# Patient Record
Sex: FEMALE | Race: WHITE | Employment: FULL TIME | URBAN - METROPOLITAN AREA
[De-identification: names, ages, dates, MRNs, and addresses within clinical notes are randomized per-mention and may not be internally consistent; named-entity substitution may affect disease eponyms.]

---

## 2021-03-24 ENCOUNTER — TRANSCRIBE ORDER (OUTPATIENT)
Dept: SCHEDULING | Age: 56
End: 2021-03-24

## 2021-03-24 DIAGNOSIS — Z12.31 VISIT FOR SCREENING MAMMOGRAM: Primary | ICD-10-CM

## 2021-04-23 ENCOUNTER — HOSPITAL ENCOUNTER (OUTPATIENT)
Dept: MAMMOGRAPHY | Age: 56
Discharge: HOME | End: 2021-04-23
Payer: COMMERCIAL

## 2021-04-23 DIAGNOSIS — Z12.31 VISIT FOR SCREENING MAMMOGRAM: ICD-10-CM

## 2021-04-23 PROCEDURE — 77067 SCR MAMMO BI INCL CAD: CPT

## 2022-01-10 PROBLEM — E55.9 VITAMIN D DEFICIENCY: Status: ACTIVE | Noted: 2017-01-26

## 2022-01-10 PROBLEM — E11.9 TYPE 2 DIABETES MELLITUS WITHOUT COMPLICATION, WITHOUT LONG-TERM CURRENT USE OF INSULIN (HCC): Status: ACTIVE | Noted: 2019-07-22

## 2022-01-10 PROBLEM — R74.8 ELEVATED LIVER ENZYMES: Status: ACTIVE | Noted: 2020-09-28

## 2022-01-10 PROBLEM — I10 HYPERTENSION: Status: ACTIVE | Noted: 2017-02-23

## 2022-01-10 PROBLEM — F41.9 ANXIETY: Status: ACTIVE | Noted: 2017-03-29

## 2022-01-10 PROBLEM — E78.5 HYPERLIPIDEMIA: Status: ACTIVE | Noted: 2017-01-26

## 2022-01-10 PROBLEM — D72.810 LYMPHOCYTOPENIA: Status: ACTIVE | Noted: 2019-07-24

## 2022-01-10 PROBLEM — M79.601 PAIN OF RIGHT ARM: Status: ACTIVE | Noted: 2017-01-26

## 2022-03-11 ENCOUNTER — TRANSCRIBE ORDER (OUTPATIENT)
Dept: SCHEDULING | Age: 57
End: 2022-03-11

## 2022-03-11 DIAGNOSIS — Z12.31 VISIT FOR SCREENING MAMMOGRAM: Primary | ICD-10-CM

## 2022-03-18 PROBLEM — M79.601 PAIN OF RIGHT ARM: Status: ACTIVE | Noted: 2017-01-26

## 2022-03-19 PROBLEM — E11.9 TYPE 2 DIABETES MELLITUS WITHOUT COMPLICATION, WITHOUT LONG-TERM CURRENT USE OF INSULIN (HCC): Status: ACTIVE | Noted: 2019-07-22

## 2022-03-19 PROBLEM — E55.9 VITAMIN D DEFICIENCY: Status: ACTIVE | Noted: 2017-01-26

## 2022-03-19 PROBLEM — E78.5 HYPERLIPIDEMIA: Status: ACTIVE | Noted: 2017-01-26

## 2022-03-19 PROBLEM — D72.810 LYMPHOCYTOPENIA: Status: ACTIVE | Noted: 2019-07-24

## 2022-03-19 PROBLEM — I10 HYPERTENSION: Status: ACTIVE | Noted: 2017-02-23

## 2022-03-20 PROBLEM — F41.9 ANXIETY: Status: ACTIVE | Noted: 2017-03-29

## 2022-03-20 PROBLEM — R74.8 ELEVATED LIVER ENZYMES: Status: ACTIVE | Noted: 2020-09-28

## 2022-05-10 ENCOUNTER — HOSPITAL ENCOUNTER (OUTPATIENT)
Dept: MAMMOGRAPHY | Age: 57
Discharge: HOME | End: 2022-05-10
Payer: COMMERCIAL

## 2022-05-10 DIAGNOSIS — Z12.31 VISIT FOR SCREENING MAMMOGRAM: ICD-10-CM

## 2022-05-10 PROCEDURE — 77067 SCR MAMMO BI INCL CAD: CPT

## 2023-01-29 ENCOUNTER — HOSPITAL ENCOUNTER (EMERGENCY)
Age: 58
Discharge: HOME | End: 2023-01-29
Attending: EMERGENCY MEDICINE
Payer: COMMERCIAL

## 2023-01-29 VITALS
TEMPERATURE: 98.2 F | RESPIRATION RATE: 14 BRPM | OXYGEN SATURATION: 100 % | WEIGHT: 231 LBS | HEART RATE: 60 BPM | DIASTOLIC BLOOD PRESSURE: 53 MMHG | BODY MASS INDEX: 38.44 KG/M2 | SYSTOLIC BLOOD PRESSURE: 120 MMHG

## 2023-01-29 DIAGNOSIS — T78.2XXA ANAPHYLAXIS, INITIAL ENCOUNTER: Primary | ICD-10-CM

## 2023-01-29 PROCEDURE — 96375 TX/PRO/DX INJ NEW DRUG ADDON: CPT

## 2023-01-29 PROCEDURE — 99284 EMERGENCY DEPT VISIT MOD MDM: CPT

## 2023-01-29 PROCEDURE — 96372 THER/PROPH/DIAG INJ SC/IM: CPT

## 2023-01-29 PROCEDURE — 94762 N-INVAS EAR/PLS OXIMTRY CONT: CPT

## 2023-01-29 PROCEDURE — 96374 THER/PROPH/DIAG INJ IV PUSH: CPT

## 2023-01-29 PROCEDURE — 74011250636 HC RX REV CODE- 250/636: Performed by: EMERGENCY MEDICINE

## 2023-01-29 RX ORDER — FAMOTIDINE 20 MG/1
20 TABLET, FILM COATED ORAL 2 TIMES DAILY
Qty: 20 TABLET | Refills: 0 | Status: SHIPPED | OUTPATIENT
Start: 2023-01-29 | End: 2023-02-08

## 2023-01-29 RX ORDER — EPINEPHRINE 0.3 MG/.3ML
0.3 INJECTION SUBCUTANEOUS
Qty: 2 EACH | Refills: 0 | Status: SHIPPED | OUTPATIENT
Start: 2023-01-29 | End: 2023-01-29

## 2023-01-29 RX ORDER — GABAPENTIN 100 MG/1
100 CAPSULE ORAL 3 TIMES DAILY
Qty: 30 CAPSULE | Refills: 0 | Status: SHIPPED | OUTPATIENT
Start: 2023-01-29

## 2023-01-29 RX ORDER — PREDNISONE 20 MG/1
20 TABLET ORAL DAILY
Qty: 7 TABLET | Refills: 0 | Status: SHIPPED | OUTPATIENT
Start: 2023-01-30 | End: 2023-02-06

## 2023-01-29 RX ORDER — FAMOTIDINE 10 MG/ML
20 INJECTION INTRAVENOUS
Status: COMPLETED | OUTPATIENT
Start: 2023-01-29 | End: 2023-01-29

## 2023-01-29 RX ORDER — SODIUM CHLORIDE 0.9 % (FLUSH) 0.9 %
5-10 SYRINGE (ML) INJECTION ONCE
Status: DISCONTINUED | OUTPATIENT
Start: 2023-01-29 | End: 2023-01-29 | Stop reason: HOSPADM

## 2023-01-29 RX ORDER — CETIRIZINE HYDROCHLORIDE 10 MG/1
10 TABLET ORAL DAILY
Qty: 20 TABLET | Refills: 0 | Status: SHIPPED | OUTPATIENT
Start: 2023-01-29

## 2023-01-29 RX ADMIN — FAMOTIDINE 20 MG: 10 INJECTION, SOLUTION INTRAVENOUS at 07:24

## 2023-01-29 RX ADMIN — EPINEPHRINE 0.3 MG: 1 INJECTION, SOLUTION, CONCENTRATE INTRAVENOUS at 07:28

## 2023-01-29 RX ADMIN — METHYLPREDNISOLONE SODIUM SUCCINATE 125 MG: 125 INJECTION, POWDER, FOR SOLUTION INTRAMUSCULAR; INTRAVENOUS at 07:26

## 2023-01-29 NOTE — Clinical Note
[address] [place] [address] EMERGENCY DEP  [address] [place] [address] Dr Brad Jacques [id number]  [phone]    Work/School Note    Date: 1/29/2023    To Whom It May concern:    Manisha Cruz was seen and treated today in the emergency room by the following provider(s):  Attending Provider: Lucho Pearl MD.      Manisha Cruz is excused from work/school on 01/29/23 and 01/30/23. She is medically clear to return to work/school on 1/31/2023. Sincerely,          Allison Dennis.  MD Teofilo

## 2023-01-29 NOTE — DISCHARGE INSTRUCTIONS
You were seen in the emergency room for allergic reaction that has progressed and continued to worsen. Take the steroids every day as prescribed and avoid your Lyrica. Continue taking the rest of your medications and monitor your symptoms closely. If you have any more swelling of the airway, use your epinephrine pen and then go directly to your closest emergency room.

## 2023-01-29 NOTE — Clinical Note
[address] [place] [address] EMERGENCY DEP  [address] [facility] [address] [place] [address]  [phone]    Work/School Note    Date: 1/29/2023    To Whom It May concern:    Yaritza Buckner was seen and treated today in the emergency room by the following provider(s):  Attending Provider: Gill Denver., MD.      Yaritza Buckner is excused from work/school on 01/29/23 and 01/30/23. She is medically clear to return to work/school on 1/31/2023.        Sincerely,          Alondra Hastings RN

## 2023-01-29 NOTE — ED PROVIDER NOTES
[facility] EMERGENCY DEP  EMERGENCY DEPARTMENT ENCOUNTER       Pt Name: Akhil Garcia  MRN: [id number]  [place] 1965  Date of evaluation: 1/29/2023  Provider: Santo Red MD   PCP: Héctor Ma NP  Note Started: 7:05 AM 1/29/23     CHIEF COMPLAINT       Chief Complaint   Patient presents with    Allergic Reaction        HISTORY OF PRESENT ILLNESS: 1 or more elements      History From: Patient  HPI Limitations : None     Akhil Garcia is a 59 y.o. female who presents ambulatory to the ER with her  for allergic reaction. Patient explains on Thursday evening she had started a new prescription of generic Lyrica and about 30 minutes later she started with diffuse hives. She took some Benadryl and Pepcid and symptoms got better but the symptoms continued coming back Friday and Saturday so yesterday she went to an local urgent care and was prescribed prednisone. She went to her pharmacy to  the prednisone yesterday but the pharmacy was closed. This morning when she woke up she felt like her face was swollen and her tongue was swollen. She noticed the hives were back. She went to take her Benadryl and Pepcid but the Benadryl got stuck in her throat a little bit and then she was able to swallow it down. She dates she feels like her tongue is swollen and had trouble swallowing water when she was in the waiting room awaiting triage. Patient denies any shortness of breath, nausea and vomiting with her symptoms. Nursing Notes were all reviewed and agreed with or any disagreements were addressed in the HPI. REVIEW OF SYSTEMS      Review of Systems   Constitutional:  Negative for activity change, appetite change, chills, fever and unexpected weight change. HENT:  Positive for facial swelling and trouble swallowing. Negative for congestion. Eyes:  Negative for pain and visual disturbance. Respiratory:  Negative for cough and shortness of breath.     Cardiovascular:  Negative for chest pain. Gastrointestinal:  Negative for abdominal pain, diarrhea, nausea and vomiting. Genitourinary:  Negative for dysuria. Musculoskeletal:  Negative for back pain. Skin:  Positive for rash. Neurological:  Negative for headaches. Positives and Pertinent negatives as per HPI. PAST HISTORY     Past Medical History:  Past Medical History:   Diagnosis Date    Family history of benign essential tremor     Hypertension     MS (multiple sclerosis) ([address]     Neurogenic bladder     Obesity     Vitamin D deficiency        Past Surgical History:  Past Surgical History:   Procedure Laterality Date    HX  SECTION      HX HIP REPLACEMENT      left    HX HYSTERECTOMY         Family History:  History reviewed. No pertinent family history. Social History:  Social History     Tobacco Use    Smoking status: Never    Smokeless tobacco: Never   Substance Use Topics    Alcohol use: Yes    Drug use: Never       Allergies: Allergies   Allergen Reactions    Ciprofloxacin Other (comments)     Dizziness and shallow breath       CURRENT MEDICATIONS      Discharge Medication List as of 2023 10:33 AM        CONTINUE these medications which have NOT CHANGED    Details   ocrelizumab (Ocrevus) 30 mg/mL soln injection Ocrevus 30 mg/mL intravenous solution   Inject by intravenous route., Historical Med      tiZANidine (ZANAFLEX) 4 mg tablet tizanidine 4 mg tablet   TAKE 1 TABLET BY MOUTH EVERY 8 HOURS AS NEEDED FOR MUSCLE SPASMS, Historical Med      trospium (SANCTURA XL) 60 mg capsule Take 1 Capsule by mouth Daily (before breakfast). , Normal, Disp-90 Capsule, R-3      amantadine HCL (SYMMETREL) 100 mg capsule amantadine HCl 100 mg capsule, Historical Med      ascorbic acid, vitamin C, (VITAMIN C) 250 mg tablet Take 250 mg by mouth daily. , Historical Med      COVID-19 mRNA Vacc (MODERNA) 100 mcg/0.5 mL susp injection Moderna COVID-19 Vaccine (PF) 100 mcg/0.5 mL intramuscular susp. (EUA)   Inject by intramuscular route., Historical Med      ergocalciferol (ERGOCALCIFEROL) 1,250 mcg (50,000 unit) capsule Vitamin D2 1,250 mcg (50,000 unit) capsule, Historical Med      hydroCHLOROthiazide (HYDRODIURIL) 25 mg tablet hydrochlorothiazide 25 mg tablet   TAKE 1 TABLET BY MOUTH ONCE DAILY, Historical Med      latanoprost (XALATAN) 0.005 % ophthalmic solution latanoprost 0.005 % eye drops, Historical Med      levothyroxine (SYNTHROID) 50 mcg tablet levothyroxine 50 mcg tablet, Historical Med      lisinopriL (PRINIVIL, ZESTRIL) 40 mg tablet lisinopril 40 mg tablet   TAKE 1 TABLET BY MOUTH DAILY, Historical Med      metFORMIN (GLUCOPHAGE) 1,000 mg tablet metformin 1,000 mg tablet   Take 1 tablet (1,000 mg total) by mouth 2 (two) times a day with meals, Historical Med      rosuvastatin (CRESTOR) 20 mg tablet rosuvastatin 20 mg tablet, Historical Med           PHYSICAL EXAM      ED Triage Vitals [01/29/23 0657]   ED Encounter Vitals Group      BP (!) 149/73      Pulse (Heart Rate) 72      Resp Rate 20      Temp 98.2 °F (36.8 °C)      Temp src       O2 Sat (%) 96 %      Weight 231 lb      Height         Physical Exam  Vitals and nursing note reviewed. Constitutional:       Appearance: She is well-developed. She is not diaphoretic. Comments: Obese female appearing comfortable sitting in triage with slightly elevated blood pressure, breathing and speaking normally without distress   HENT:      Head: Normocephalic and atraumatic. Mouth/Throat:      Comments: Mouth appears dry, Mallampati 3 without good visualization of the posterior oropharynx. Patient notes the tongue feels swollen but I do not see evidence of lingual edema or sublingual edema  Eyes:      General:         Right eye: No discharge. Left eye: No discharge. Conjunctiva/sclera: Conjunctivae normal.      Pupils: Pupils are equal, round, and reactive to light. Comments: Mild periorbital edema   Neck:      Vascular: No carotid bruit. Comments: There is no evidence of stridor  Cardiovascular:      Rate and Rhythm: Normal rate and regular rhythm. Heart sounds: Normal heart sounds. No murmur heard. No friction rub. No gallop. Pulmonary:      Effort: Pulmonary effort is normal. No respiratory distress. Breath sounds: Normal breath sounds. No wheezing, rhonchi or rales. Abdominal:      General: Bowel sounds are normal. There is no distension. Palpations: Abdomen is soft. Tenderness: There is no abdominal tenderness. Musculoskeletal:         General: Normal range of motion. Cervical back: Normal range of motion and neck supple. No rigidity or tenderness. Right lower leg: No edema. Left lower leg: No edema. Lymphadenopathy:      Cervical: No cervical adenopathy. Skin:     General: Skin is warm and dry. Findings: Rash (Diffuse coalescing urticaria) present. Neurological:      Mental Status: She is alert and oriented to person, place, and time. Cranial Nerves: No cranial nerve deficit. Motor: No abnormal muscle tone. PROCEDURES   Unless otherwise noted below, none  Procedures     CRITICAL CARE TIME   CRITICAL CARE NOTE :  10am  IMPENDING DETERIORATION -Airway, Respiratory, and Cardiovascular  ASSOCIATED RISK FACTORS - Hypotension, Shock, Dysrhythmia, and Vascular Compromise  MANAGEMENT- Bedside Assessment and Supervision of Care  INTERPRETATION -  Blood Pressure  INTERVENTIONS -IM epinephrine  CASE REVIEW - Nursing and Family  TREATMENT RESPONSE -Improved  PERFORMED BY - Self    NOTES   :  I have spent 35 minutes of critical care time involved in lab review, consultations with specialist, family decision- making, bedside attention and documentation; time exculsive of EKG review/interpretation. During this entire length of time I was immediately available to the patient. Yeimy Castro.  MD Teofilo      EMERGENCY DEPARTMENT COURSE and DIFFERENTIAL DIAGNOSIS/MDM   Vitals:    Vitals: 01/29/23 0657 01/29/23 0715 01/29/23 1026   BP: (!) 149/73 131/69 (!) 120/53   Pulse: 72 60    Resp: 20 14 14   Temp: 98.2 °F (36.8 °C)     SpO2: 96% 100%    Weight: 104.8 kg (231 lb)          Patient was given the following medications:  Medications   sodium chloride (NS) flush 5-10 mL (has no administration in time range)   EPINEPHrine (ADRENALIN) 1 mg/mL injection 0.3 mg (0.3 mg IntraMUSCular Given 1/29/23 0728)   methylPREDNISolone (PF) (Solu-MEDROL) injection 125 mg (125 mg IntraVENous Given 1/29/23 0726)   famotidine (PF) (PEPCID) injection 20 mg (20 mg IntraVENous Given 1/29/23 0724)       CONSULTS: (Who and What was discussed)  None    Chronic Conditions: MS, obesity, hypertension    Social Determinants affecting Dx or Tx: None    Records Reviewed (source and summary of external notes): Prior medical records and Nursing notes    CC/HPI Summary, DDx, ED Course, and Reassessment: Middle-aged female presenting with continuous urticaria and now facial swelling. She does have periorbital edema on exam and I am not appreciating edema in the posterior pharynx or on her tongue. IM epinephrine to be provided and discussed with patient she will need to stay for continuous monitoring for at least a couple hours. At this time I do not see any evidence of airway compromise and patient appears quite hemodynamically stable. ED Course as of 01/29/23 1138   Sun Jan 29, 2023   0800 Patient reevaluated and sitting comfortably. She states she is not feeling much better she appears to be breathing easy without evidence of worsening. We will continue monitoring. [JT]   [id number] Patient reevaluated and sitting comfortably. States she is feeling better. Continue observation until 10:00 and then will reassess with plan for disposition [JT]   0951 Patient continued to sit comfortably. Monitor without evidence of distress.  [JT]      ED Course User Index  [JT] Janae Rde MD       Disposition Considerations (Tests not done, Shared Decision Making, Pt Expectation of Test or Tx.): Middle-aged female with history of hypertension and MS on Lyrica for neurologic symptoms, presents ambulatory for allergic reaction which has progressed to anaphylaxis with airway involvement. Given the fact that she is having the urticaria for the past 3 days and now has her involvement, I have low suspicion for angioedema secondary to lisinopril use. Medications provided with IM epinephrine as well as IV Solu-Medrol and histamine blockers with close monitoring without any return of symptoms 3 hours after epinephrine. Patient is stable to go home with outpatient medications including an EpiPen. Recommend she follow-up with her primary care doctor for repeat check of her symptoms this week and discussion regarding medication options after this reaction to Lyrica. FINAL IMPRESSION     1. Anaphylaxis, initial encounter          DISPOSITION/PLAN   Discharged    Discharge Note: The patient is stable for discharge home. The signs, symptoms, diagnosis, and discharge instructions have been discussed, understanding conveyed, and agreed upon. The patient is to follow up as recommended or return to ER should their symptoms worsen. PATIENT REFERRED TO:  Follow-up Information       Follow up With Specialties Details Why Contact Info    Liliam Estes NP Nurse Practitioner Schedule an appointment as soon as possible for a visit  For recheck this week [address] [facility]  [phone]      [address] [facility] [address] [facility] Emergency Medicine  If symptoms worsen [address] [facility] [address]  [phone]              DISCHARGE MEDICATIONS:  Discharge Medication List as of 1/29/2023 10:33 AM        START taking these medications    Details   predniSONE (DELTASONE) 20 mg tablet Take 1 Tablet by mouth daily for 7 days.  With Breakfast, Normal, Disp-7 Tablet, R-0      famotidine (Pepcid) 20 mg tablet Take 1 Tablet by mouth two (2) times a day for 10 days. , Normal, Disp-20 Tablet, R-0      cetirizine (ZyrTEC) 10 mg tablet Take 1 Tablet by mouth daily. , Normal, Disp-20 Tablet, R-0      EPINEPHrine (EPIPEN) 0.3 mg/0.3 mL injection 0.3 mL by IntraMUSCular route once as needed for Allergic Response for up to 1 dose., Normal, Disp-2 Each, R-0      gabapentin (NEURONTIN) 100 mg capsule Take 1 Capsule by mouth three (3) times daily. Max Daily Amount: 300 mg., Normal, Disp-30 Capsule, R-0           CONTINUE these medications which have NOT CHANGED    Details   ocrelizumab (Ocrevus) 30 mg/mL soln injection Ocrevus 30 mg/mL intravenous solution   Inject by intravenous route., Historical Med      tiZANidine (ZANAFLEX) 4 mg tablet tizanidine 4 mg tablet   TAKE 1 TABLET BY MOUTH EVERY 8 HOURS AS NEEDED FOR MUSCLE SPASMS, Historical Med      trospium (SANCTURA XL) 60 mg capsule Take 1 Capsule by mouth Daily (before breakfast). , Normal, Disp-90 Capsule, R-3      amantadine HCL (SYMMETREL) 100 mg capsule amantadine HCl 100 mg capsule, Historical Med      ascorbic acid, vitamin C, (VITAMIN C) 250 mg tablet Take 250 mg by mouth daily. , Historical Med      COVID-19 mRNA Vacc (MODERNA) 100 mcg/0.5 mL susp injection Moderna COVID-19 Vaccine (PF) 100 mcg/0.5 mL intramuscular susp. (EUA)   Inject by intramuscular route., Historical Med      ergocalciferol (ERGOCALCIFEROL) 1,250 mcg (50,000 unit) capsule Vitamin D2 1,250 mcg (50,000 unit) capsule, Historical Med      hydroCHLOROthiazide (HYDRODIURIL) 25 mg tablet hydrochlorothiazide 25 mg tablet   TAKE 1 TABLET BY MOUTH ONCE DAILY, Historical Med      latanoprost (XALATAN) 0.005 % ophthalmic solution latanoprost 0.005 % eye drops, Historical Med      levothyroxine (SYNTHROID) 50 mcg tablet levothyroxine 50 mcg tablet, Historical Med      lisinopriL (PRINIVIL, ZESTRIL) 40 mg tablet lisinopril 40 mg tablet   TAKE 1 TABLET BY MOUTH DAILY, Historical Med      metFORMIN (GLUCOPHAGE) 1,000 mg tablet metformin 1,000 mg tablet   Take 1 tablet (1,000 mg total) by mouth 2 (two) times a day with meals, Historical Med      rosuvastatin (CRESTOR) 20 mg tablet rosuvastatin 20 mg tablet, Historical Med               DISCONTINUED MEDICATIONS:  Discharge Medication List as of 1/29/2023 10:33 AM          I am the Primary Clinician of Record. Ramirez Larose. MD Teofilo (electronically signed)    (Please note that parts of this dictation were completed with voice recognition software. Quite often unanticipated grammatical, syntax, homophones, and other interpretive errors are inadvertently transcribed by the computer software. Please disregards these errors.  Please excuse any errors that have escaped final proofreading.)

## 2023-01-29 NOTE — ED TRIAGE NOTES
Patient had allergic reaction to prescribed medication, was seen at urgent, Patients pharmacy was closed, patient unable to  her steroids, concerned about the delay in taking medication and airway control.  Patients airway is open at time of arrival, no sign of distress at arrival.

## 2023-03-28 ENCOUNTER — HOSPITAL ENCOUNTER (EMERGENCY)
Age: 58
Discharge: ANOTHER HOSPITAL | End: 2023-03-29
Attending: EMERGENCY MEDICINE | Admitting: EMERGENCY MEDICINE
Payer: COMMERCIAL

## 2023-03-28 ENCOUNTER — APPOINTMENT (OUTPATIENT)
Dept: GENERAL RADIOLOGY | Age: 58
End: 2023-03-28
Attending: EMERGENCY MEDICINE
Payer: COMMERCIAL

## 2023-03-28 ENCOUNTER — APPOINTMENT (OUTPATIENT)
Dept: CT IMAGING | Age: 58
End: 2023-03-28
Attending: EMERGENCY MEDICINE
Payer: COMMERCIAL

## 2023-03-28 DIAGNOSIS — I20.0 UNSTABLE ANGINA (HCC): Primary | ICD-10-CM

## 2023-03-28 LAB
ALBUMIN SERPL-MCNC: 4.3 G/DL (ref 3.5–5)
ALBUMIN/GLOB SERPL: 1.4 (ref 1.1–2.2)
ALP SERPL-CCNC: 60 U/L (ref 45–117)
ALT SERPL-CCNC: 17 U/L (ref 12–78)
ANION GAP SERPL CALC-SCNC: 12 MMOL/L (ref 5–15)
AST SERPL-CCNC: 17 U/L (ref 15–37)
BASOPHILS # BLD: 0 K/UL (ref 0–0.1)
BASOPHILS NFR BLD: 0 % (ref 0–1)
BILIRUB SERPL-MCNC: 0.7 MG/DL (ref 0.2–1)
BUN SERPL-MCNC: 23 MG/DL (ref 6–20)
BUN/CREAT SERPL: 23 (ref 12–20)
CALCIUM SERPL-MCNC: 9.1 MG/DL (ref 8.5–10.1)
CHLORIDE SERPL-SCNC: 101 MMOL/L (ref 97–108)
CO2 SERPL-SCNC: 26 MMOL/L (ref 21–32)
CREAT SERPL-MCNC: 1.01 MG/DL (ref 0.55–1.02)
D DIMER PPP FEU-MCNC: 0.9 MG/L FEU (ref 0–0.65)
DIFFERENTIAL METHOD BLD: ABNORMAL
EOSINOPHIL # BLD: 0.1 K/UL (ref 0–0.4)
EOSINOPHIL NFR BLD: 1 % (ref 0–7)
ERYTHROCYTE [DISTWIDTH] IN BLOOD BY AUTOMATED COUNT: 12.1 % (ref 11.5–14.5)
GLOBULIN SER CALC-MCNC: 3 G/DL (ref 2–4)
GLUCOSE SERPL-MCNC: 90 MG/DL (ref 65–100)
HCT VFR BLD AUTO: 36.3 % (ref 35–47)
HGB BLD-MCNC: 12.3 G/DL (ref 11.5–16)
IMM GRANULOCYTES # BLD AUTO: 0 K/UL (ref 0–0.04)
IMM GRANULOCYTES NFR BLD AUTO: 1 % (ref 0–0.5)
INR PPP: 0.9 (ref 0.9–1.1)
LYMPHOCYTES # BLD: 0.8 K/UL (ref 0.8–3.5)
LYMPHOCYTES NFR BLD: 10 % (ref 12–49)
MCH RBC QN AUTO: 30.1 PG (ref 26–34)
MCHC RBC AUTO-ENTMCNC: 33.9 G/DL (ref 30–36.5)
MCV RBC AUTO: 89 FL (ref 80–99)
MONOCYTES # BLD: 0.9 K/UL (ref 0–1)
MONOCYTES NFR BLD: 11 % (ref 5–13)
NEUTS SEG # BLD: 6.5 K/UL (ref 1.8–8)
NEUTS SEG NFR BLD: 77 % (ref 32–75)
NRBC # BLD: 0 K/UL (ref 0–0.01)
NRBC BLD-RTO: 0 PER 100 WBC
PLATELET # BLD AUTO: 228 K/UL (ref 150–400)
PMV BLD AUTO: 10.1 FL (ref 8.9–12.9)
POTASSIUM SERPL-SCNC: 3.9 MMOL/L (ref 3.5–5.1)
PROT SERPL-MCNC: 7.3 G/DL (ref 6.4–8.2)
PROTHROMBIN TIME: 9.4 SEC (ref 9–11.1)
RBC # BLD AUTO: 4.08 M/UL (ref 3.8–5.2)
SODIUM SERPL-SCNC: 139 MMOL/L (ref 136–145)
TROPONIN I SERPL HS-MCNC: 6 NG/L (ref 0–51)
WBC # BLD AUTO: 8.4 K/UL (ref 3.6–11)

## 2023-03-28 PROCEDURE — 84484 ASSAY OF TROPONIN QUANT: CPT

## 2023-03-28 PROCEDURE — 71275 CT ANGIOGRAPHY CHEST: CPT

## 2023-03-28 PROCEDURE — 74011000636 HC RX REV CODE- 636: Performed by: EMERGENCY MEDICINE

## 2023-03-28 PROCEDURE — 85025 COMPLETE CBC W/AUTO DIFF WBC: CPT

## 2023-03-28 PROCEDURE — 96374 THER/PROPH/DIAG INJ IV PUSH: CPT | Performed by: EMERGENCY MEDICINE

## 2023-03-28 PROCEDURE — 96375 TX/PRO/DX INJ NEW DRUG ADDON: CPT | Performed by: EMERGENCY MEDICINE

## 2023-03-28 PROCEDURE — 74011250636 HC RX REV CODE- 250/636: Performed by: EMERGENCY MEDICINE

## 2023-03-28 PROCEDURE — 85379 FIBRIN DEGRADATION QUANT: CPT

## 2023-03-28 PROCEDURE — 71045 X-RAY EXAM CHEST 1 VIEW: CPT

## 2023-03-28 PROCEDURE — 74011250637 HC RX REV CODE- 250/637: Performed by: EMERGENCY MEDICINE

## 2023-03-28 PROCEDURE — 93005 ELECTROCARDIOGRAM TRACING: CPT

## 2023-03-28 PROCEDURE — 85610 PROTHROMBIN TIME: CPT

## 2023-03-28 PROCEDURE — 80053 COMPREHEN METABOLIC PANEL: CPT

## 2023-03-28 PROCEDURE — 36415 COLL VENOUS BLD VENIPUNCTURE: CPT

## 2023-03-28 PROCEDURE — 99285 EMERGENCY DEPT VISIT HI MDM: CPT | Performed by: EMERGENCY MEDICINE

## 2023-03-28 RX ORDER — MORPHINE SULFATE 4 MG/ML
4 INJECTION INTRAVENOUS ONCE
Status: COMPLETED | OUTPATIENT
Start: 2023-03-28 | End: 2023-03-28

## 2023-03-28 RX ORDER — ONDANSETRON 2 MG/ML
4 INJECTION INTRAMUSCULAR; INTRAVENOUS
Status: COMPLETED | OUTPATIENT
Start: 2023-03-28 | End: 2023-03-28

## 2023-03-28 RX ORDER — ASPIRIN 325 MG
325 TABLET ORAL ONCE
Status: COMPLETED | OUTPATIENT
Start: 2023-03-28 | End: 2023-03-28

## 2023-03-28 RX ADMIN — ONDANSETRON 4 MG: 2 INJECTION INTRAMUSCULAR; INTRAVENOUS at 21:25

## 2023-03-28 RX ADMIN — MORPHINE SULFATE 4 MG: 4 INJECTION, SOLUTION INTRAMUSCULAR; INTRAVENOUS at 21:26

## 2023-03-28 RX ADMIN — IOPAMIDOL 100 ML: 755 INJECTION, SOLUTION INTRAVENOUS at 23:09

## 2023-03-28 RX ADMIN — ASPIRIN 325 MG ORAL TABLET 325 MG: 325 PILL ORAL at 21:25

## 2023-03-29 ENCOUNTER — HOSPITAL ENCOUNTER (OUTPATIENT)
Age: 58
Setting detail: OBSERVATION
LOS: 1 days | Discharge: HOME | End: 2023-03-30
Attending: EMERGENCY MEDICINE | Admitting: INTERNAL MEDICINE
Payer: COMMERCIAL

## 2023-03-29 ENCOUNTER — APPOINTMENT (OUTPATIENT)
Dept: NON INVASIVE DIAGNOSTICS | Age: 58
End: 2023-03-29
Attending: INTERNAL MEDICINE
Payer: COMMERCIAL

## 2023-03-29 VITALS
TEMPERATURE: 98.2 F | RESPIRATION RATE: 16 BRPM | HEIGHT: 65 IN | OXYGEN SATURATION: 99 % | HEART RATE: 72 BPM | SYSTOLIC BLOOD PRESSURE: 163 MMHG | BODY MASS INDEX: 40.22 KG/M2 | DIASTOLIC BLOOD PRESSURE: 82 MMHG | WEIGHT: 241.4 LBS

## 2023-03-29 DIAGNOSIS — R07.9 CHEST PAIN, UNSPECIFIED TYPE: Primary | ICD-10-CM

## 2023-03-29 DIAGNOSIS — I25.10 CORONARY ARTERY DISEASE INVOLVING NATIVE HEART WITHOUT ANGINA PECTORIS, UNSPECIFIED VESSEL OR LESION TYPE: ICD-10-CM

## 2023-03-29 LAB
ATRIAL RATE: 66 BPM
ATRIAL RATE: 67 BPM
ATRIAL RATE: 80 BPM
ATRIAL RATE: 85 BPM
CALCULATED P AXIS, ECG09: 54 DEGREES
CALCULATED P AXIS, ECG09: 54 DEGREES
CALCULATED P AXIS, ECG09: 55 DEGREES
CALCULATED P AXIS, ECG09: 79 DEGREES
CALCULATED R AXIS, ECG10: -26 DEGREES
CALCULATED R AXIS, ECG10: -32 DEGREES
CALCULATED R AXIS, ECG10: -38 DEGREES
CALCULATED R AXIS, ECG10: -9 DEGREES
CALCULATED T AXIS, ECG11: 21 DEGREES
CALCULATED T AXIS, ECG11: 23 DEGREES
CALCULATED T AXIS, ECG11: 27 DEGREES
CALCULATED T AXIS, ECG11: 29 DEGREES
DIAGNOSIS, 93000: NORMAL
ECHO AO ROOT DIAM: 3.8 CM
ECHO AO ROOT INDEX: 1.78 CM/M2
ECHO AV AREA PEAK VELOCITY: 3 CM2
ECHO AV AREA/BSA PEAK VELOCITY: 1.4 CM2/M2
ECHO AV PEAK GRADIENT: 13 MMHG
ECHO AV PEAK VELOCITY: 1.8 M/S
ECHO AV VELOCITY RATIO: 0.61
ECHO LA VOL 2C: 45 ML (ref 22–52)
ECHO LA VOL 4C: 38 ML (ref 22–52)
ECHO LA VOLUME AREA LENGTH: 43 ML
ECHO LA VOLUME INDEX A2C: 21 ML/M2 (ref 16–34)
ECHO LA VOLUME INDEX A4C: 18 ML/M2 (ref 16–34)
ECHO LA VOLUME INDEX AREA LENGTH: 20 ML/M2 (ref 16–34)
ECHO LV EDV A4C: 69 ML
ECHO LV EDV INDEX A4C: 32 ML/M2
ECHO LV EJECTION FRACTION A4C: 62 %
ECHO LV ESV A4C: 26 ML
ECHO LV ESV INDEX A4C: 12 ML/M2
ECHO LV INTERNAL DIMENSION DIASTOLIC: 1 CM (ref 3.9–5.3)
ECHO LV INTERNAL DIMENSION DIASTOLIC: 3.3 CM (ref 3.9–5.3)
ECHO LV IVSD: 3.1 CM (ref 0.6–0.9)
ECHO LV POSTERIOR WALL DIASTOLIC: 1.4 CM (ref 0.6–0.9)
ECHO LVOT AREA: 4.9 CM2
ECHO LVOT DIAM: 2.5 CM
ECHO LVOT PEAK GRADIENT: 4 MMHG
ECHO LVOT PEAK VELOCITY: 1.1 M/S
ECHO MV A VELOCITY: 1.03 M/S
ECHO MV E DECELERATION TIME (DT): 163.5 MS
ECHO MV E VELOCITY: 0.66 M/S
ECHO MV E/A RATIO: 0.64
ECHO MV MAX VELOCITY: 1.1 M/S
ECHO MV MEAN GRADIENT: 2 MMHG
ECHO MV MEAN VELOCITY: 0.7 M/S
ECHO MV PEAK GRADIENT: 5 MMHG
ECHO MV VTI: 23.5 CM
GLUCOSE BLD STRIP.AUTO-MCNC: 102 MG/DL (ref 65–117)
GLUCOSE BLD STRIP.AUTO-MCNC: 154 MG/DL (ref 65–117)
P-R INTERVAL, ECG05: 176 MS
P-R INTERVAL, ECG05: 182 MS
P-R INTERVAL, ECG05: 184 MS
P-R INTERVAL, ECG05: 188 MS
Q-T INTERVAL, ECG07: 418 MS
Q-T INTERVAL, ECG07: 420 MS
Q-T INTERVAL, ECG07: 440 MS
Q-T INTERVAL, ECG07: 452 MS
QRS DURATION, ECG06: 136 MS
QRS DURATION, ECG06: 144 MS
QRS DURATION, ECG06: 144 MS
QRS DURATION, ECG06: 146 MS
QTC CALCULATION (BEZET), ECG08: 461 MS
QTC CALCULATION (BEZET), ECG08: 477 MS
QTC CALCULATION (BEZET), ECG08: 484 MS
QTC CALCULATION (BEZET), ECG08: 497 MS
SERVICE CMNT-IMP: ABNORMAL
SERVICE CMNT-IMP: NORMAL
TROPONIN I SERPL HS-MCNC: 5 NG/L (ref 0–51)
TROPONIN I SERPL HS-MCNC: 6 NG/L (ref 0–51)
TROPONIN I SERPL HS-MCNC: 6 NG/L (ref 0–51)
VENTRICULAR RATE, ECG03: 66 BPM
VENTRICULAR RATE, ECG03: 67 BPM
VENTRICULAR RATE, ECG03: 80 BPM
VENTRICULAR RATE, ECG03: 85 BPM

## 2023-03-29 PROCEDURE — 93005 ELECTROCARDIOGRAM TRACING: CPT

## 2023-03-29 PROCEDURE — 74011250637 HC RX REV CODE- 250/637: Performed by: EMERGENCY MEDICINE

## 2023-03-29 PROCEDURE — G0378 HOSPITAL OBSERVATION PER HR: HCPCS

## 2023-03-29 PROCEDURE — 74011250637 HC RX REV CODE- 250/637: Performed by: INTERNAL MEDICINE

## 2023-03-29 PROCEDURE — 96374 THER/PROPH/DIAG INJ IV PUSH: CPT

## 2023-03-29 PROCEDURE — 74011636637 HC RX REV CODE- 636/637: Performed by: INTERNAL MEDICINE

## 2023-03-29 PROCEDURE — 84484 ASSAY OF TROPONIN QUANT: CPT

## 2023-03-29 PROCEDURE — 74011000250 HC RX REV CODE- 250: Performed by: INTERNAL MEDICINE

## 2023-03-29 PROCEDURE — 74011250636 HC RX REV CODE- 250/636

## 2023-03-29 PROCEDURE — 36415 COLL VENOUS BLD VENIPUNCTURE: CPT

## 2023-03-29 PROCEDURE — 82962 GLUCOSE BLOOD TEST: CPT

## 2023-03-29 PROCEDURE — 93306 TTE W/DOPPLER COMPLETE: CPT

## 2023-03-29 PROCEDURE — 74011250636 HC RX REV CODE- 250/636: Performed by: EMERGENCY MEDICINE

## 2023-03-29 PROCEDURE — 99285 EMERGENCY DEPT VISIT HI MDM: CPT

## 2023-03-29 PROCEDURE — 96375 TX/PRO/DX INJ NEW DRUG ADDON: CPT

## 2023-03-29 RX ORDER — GABAPENTIN 100 MG/1
100 CAPSULE ORAL 3 TIMES DAILY
Status: DISCONTINUED | OUTPATIENT
Start: 2023-03-29 | End: 2023-03-29 | Stop reason: HOSPADM

## 2023-03-29 RX ORDER — KETOROLAC TROMETHAMINE 30 MG/ML
15 INJECTION, SOLUTION INTRAMUSCULAR; INTRAVENOUS
Status: COMPLETED | OUTPATIENT
Start: 2023-03-29 | End: 2023-03-29

## 2023-03-29 RX ORDER — POLYETHYLENE GLYCOL 3350 17 G/17G
17 POWDER, FOR SOLUTION ORAL DAILY PRN
Status: DISCONTINUED | OUTPATIENT
Start: 2023-03-29 | End: 2023-03-30 | Stop reason: HOSPADM

## 2023-03-29 RX ORDER — ONDANSETRON 2 MG/ML
4 INJECTION INTRAMUSCULAR; INTRAVENOUS ONCE
Status: COMPLETED | OUTPATIENT
Start: 2023-03-29 | End: 2023-03-29

## 2023-03-29 RX ORDER — CYCLOBENZAPRINE HCL 10 MG
TABLET ORAL
COMMUNITY

## 2023-03-29 RX ORDER — LEVOTHYROXINE SODIUM 50 UG/1
50 TABLET ORAL
Status: DISCONTINUED | OUTPATIENT
Start: 2023-03-30 | End: 2023-03-30 | Stop reason: HOSPADM

## 2023-03-29 RX ORDER — ACETAMINOPHEN 500 MG
1000 TABLET ORAL
Status: COMPLETED | OUTPATIENT
Start: 2023-03-29 | End: 2023-03-29

## 2023-03-29 RX ORDER — INSULIN LISPRO 100 [IU]/ML
INJECTION, SOLUTION INTRAVENOUS; SUBCUTANEOUS
Status: DISCONTINUED | OUTPATIENT
Start: 2023-03-29 | End: 2023-03-30 | Stop reason: HOSPADM

## 2023-03-29 RX ORDER — ENOXAPARIN SODIUM 100 MG/ML
30 INJECTION SUBCUTANEOUS EVERY 12 HOURS
Status: DISCONTINUED | OUTPATIENT
Start: 2023-03-30 | End: 2023-03-30 | Stop reason: HOSPADM

## 2023-03-29 RX ORDER — ONDANSETRON 2 MG/ML
4 INJECTION INTRAMUSCULAR; INTRAVENOUS
Status: DISCONTINUED | OUTPATIENT
Start: 2023-03-29 | End: 2023-03-30 | Stop reason: HOSPADM

## 2023-03-29 RX ORDER — ACETAMINOPHEN 325 MG/1
650 TABLET ORAL
Status: COMPLETED | OUTPATIENT
Start: 2023-03-29 | End: 2023-03-29

## 2023-03-29 RX ORDER — LISINOPRIL 40 MG/1
40 TABLET ORAL DAILY
Status: DISCONTINUED | OUTPATIENT
Start: 2023-03-30 | End: 2023-03-30 | Stop reason: HOSPADM

## 2023-03-29 RX ORDER — ROSUVASTATIN CALCIUM 20 MG/1
20 TABLET, COATED ORAL
Status: DISCONTINUED | OUTPATIENT
Start: 2023-03-29 | End: 2023-03-30 | Stop reason: HOSPADM

## 2023-03-29 RX ORDER — ACETAMINOPHEN 650 MG/1
650 SUPPOSITORY RECTAL
Status: DISCONTINUED | OUTPATIENT
Start: 2023-03-29 | End: 2023-03-30 | Stop reason: HOSPADM

## 2023-03-29 RX ORDER — LATANOPROST 50 UG/ML
1 SOLUTION/ DROPS OPHTHALMIC EVERY EVENING
Status: DISCONTINUED | OUTPATIENT
Start: 2023-03-29 | End: 2023-03-30 | Stop reason: HOSPADM

## 2023-03-29 RX ORDER — ONDANSETRON 4 MG/1
4 TABLET, ORALLY DISINTEGRATING ORAL
Status: DISCONTINUED | OUTPATIENT
Start: 2023-03-29 | End: 2023-03-30 | Stop reason: HOSPADM

## 2023-03-29 RX ORDER — NITROGLYCERIN 0.4 MG/1
0.4 TABLET SUBLINGUAL AS NEEDED
Status: DISCONTINUED | OUTPATIENT
Start: 2023-03-29 | End: 2023-03-30 | Stop reason: HOSPADM

## 2023-03-29 RX ORDER — LISINOPRIL 20 MG/1
40 TABLET ORAL
Status: COMPLETED | OUTPATIENT
Start: 2023-03-29 | End: 2023-03-29

## 2023-03-29 RX ORDER — GUAIFENESIN 100 MG/5ML
81 LIQUID (ML) ORAL DAILY
Status: DISCONTINUED | OUTPATIENT
Start: 2023-03-30 | End: 2023-03-30 | Stop reason: HOSPADM

## 2023-03-29 RX ORDER — SODIUM CHLORIDE 0.9 % (FLUSH) 0.9 %
5-40 SYRINGE (ML) INJECTION AS NEEDED
Status: DISCONTINUED | OUTPATIENT
Start: 2023-03-29 | End: 2023-03-30 | Stop reason: HOSPADM

## 2023-03-29 RX ORDER — PANTOPRAZOLE SODIUM 40 MG/1
40 TABLET, DELAYED RELEASE ORAL
Status: DISCONTINUED | OUTPATIENT
Start: 2023-03-29 | End: 2023-03-30 | Stop reason: HOSPADM

## 2023-03-29 RX ORDER — IBUPROFEN 200 MG
4 TABLET ORAL AS NEEDED
Status: DISCONTINUED | OUTPATIENT
Start: 2023-03-29 | End: 2023-03-30 | Stop reason: HOSPADM

## 2023-03-29 RX ORDER — TIZANIDINE 4 MG/1
4 TABLET ORAL
Status: DISCONTINUED | OUTPATIENT
Start: 2023-03-29 | End: 2023-03-30 | Stop reason: HOSPADM

## 2023-03-29 RX ORDER — ACETAMINOPHEN 325 MG/1
650 TABLET ORAL
Status: DISCONTINUED | OUTPATIENT
Start: 2023-03-29 | End: 2023-03-30 | Stop reason: HOSPADM

## 2023-03-29 RX ORDER — GABAPENTIN 100 MG/1
100 CAPSULE ORAL 3 TIMES DAILY
Status: DISCONTINUED | OUTPATIENT
Start: 2023-03-29 | End: 2023-03-30 | Stop reason: HOSPADM

## 2023-03-29 RX ORDER — SODIUM CHLORIDE 0.9 % (FLUSH) 0.9 %
5-40 SYRINGE (ML) INJECTION EVERY 8 HOURS
Status: DISCONTINUED | OUTPATIENT
Start: 2023-03-29 | End: 2023-03-30 | Stop reason: HOSPADM

## 2023-03-29 RX ORDER — ONDANSETRON 2 MG/ML
INJECTION INTRAMUSCULAR; INTRAVENOUS
Status: COMPLETED
Start: 2023-03-29 | End: 2023-03-29

## 2023-03-29 RX ORDER — OXYBUTYNIN CHLORIDE 5 MG/1
10 TABLET, EXTENDED RELEASE ORAL DAILY
Status: DISCONTINUED | OUTPATIENT
Start: 2023-03-30 | End: 2023-03-30 | Stop reason: HOSPADM

## 2023-03-29 RX ADMIN — ONDANSETRON 4 MG: 2 INJECTION INTRAMUSCULAR; INTRAVENOUS at 12:38

## 2023-03-29 RX ADMIN — NITROGLYCERIN 0.5 INCH: 20 OINTMENT TOPICAL at 07:38

## 2023-03-29 RX ADMIN — KETOROLAC TROMETHAMINE 15 MG: 30 INJECTION, SOLUTION INTRAMUSCULAR; INTRAVENOUS at 12:38

## 2023-03-29 RX ADMIN — ACETAMINOPHEN 1000 MG: 500 TABLET ORAL at 09:55

## 2023-03-29 RX ADMIN — GABAPENTIN 100 MG: 100 CAPSULE ORAL at 15:50

## 2023-03-29 RX ADMIN — GABAPENTIN 100 MG: 100 CAPSULE ORAL at 07:43

## 2023-03-29 RX ADMIN — PANTOPRAZOLE SODIUM 40 MG: 40 TABLET, DELAYED RELEASE ORAL at 15:50

## 2023-03-29 RX ADMIN — Medication 2 UNITS: at 18:16

## 2023-03-29 RX ADMIN — SODIUM CHLORIDE, PRESERVATIVE FREE 10 ML: 5 INJECTION INTRAVENOUS at 23:04

## 2023-03-29 RX ADMIN — LISINOPRIL 40 MG: 20 TABLET ORAL at 07:43

## 2023-03-29 RX ADMIN — SODIUM CHLORIDE, PRESERVATIVE FREE 10 ML: 5 INJECTION INTRAVENOUS at 15:50

## 2023-03-29 RX ADMIN — ACETAMINOPHEN 325MG 650 MG: 325 TABLET ORAL at 03:40

## 2023-03-29 RX ADMIN — LATANOPROST 1 DROP: 50 SOLUTION OPHTHALMIC at 18:54

## 2023-03-29 RX ADMIN — ROSUVASTATIN CALCIUM 20 MG: 20 TABLET, FILM COATED ORAL at 23:03

## 2023-03-29 RX ADMIN — GABAPENTIN 100 MG: 100 CAPSULE ORAL at 23:02

## 2023-03-29 NOTE — ED TRIAGE NOTES
Pt reports mid chest pain that started at 0900 - increasing chest pressure/sharp pain per pt. Reports nausea. Denies SOB.

## 2023-03-29 NOTE — ED NOTES
TRANSFER - OUT REPORT:    Verbal report given to Juju Ruvalcaba RN Charge on Promise Bee  being transferred to [facility] ED(unit) for change in patient condition(Unstable Angina)       Report consisted of patients Situation, Background, Assessment and   Recommendations(SBAR). Information from the following report(s) SBAR, Kardex, ED Summary, Procedure Summary, Intake/Output, MAR, Recent Results, Med Rec Status and Cardiac Rhythm sinus rhythm was reviewed with the receiving nurse. Lines:   Peripheral IV 03/28/23 Right Antecubital (Active)   Site Assessment Clean, dry, & intact 03/28/23 2105   Phlebitis Assessment 0 03/28/23 2105   Infiltration Assessment 0 03/28/23 2105   Dressing Status Clean, dry, & intact 03/28/23 2105   Dressing Type Transparent 03/28/23 2105   Hub Color/Line Status Pink;Flushed 03/28/23 2105   Action Taken Blood drawn 03/28/23 2105        Opportunity for questions and clarification was provided.       Patient transported with:   Monitor

## 2023-03-29 NOTE — ED PROVIDER NOTES
MRM [facility]       Pt Name: Alok Ulloa  MRN: [id number]  [place] 1965  Date of evaluation: 3/29/2023  Provider: Jessee Linton MD   PCP: Sangeeta Mathews NP  Note Started: 12:25 PM 3/29/23     CHIEF COMPLAINT       Chief Complaint   Patient presents with    Chest Pain     Patient was sent from [facility] for complaints of chest pain since last night. Patient with 1in nitro paste on L chest in triage. Reports 4/10 CP in triage. [facility] sent because they do not have capability of performing stress test or echo. HISTORY OF PRESENT ILLNESS: 1 or more elements      History From: patient, History limited by: none     Alok Ulloa is a 59 y.o. female who presents for transfer of care. 55-year-old female with a history of multiple sclerosis, hypertension, diabetes, hyperlipidemia and hypothyroidism presents emergency department as a transfer of care. Patient seen in outside hospital for fatigue and chest pressure. She reports this began at 9 AM.  She reports a strong family history of CAD. She reports substernal chest pressure which was initially 8 out of 10 at rest and then improved. Worse with inspiration and improved with nothing. There underwent EKG, cardiac enzymes which were unremarkable as well as a CTA which was negative for PE but did show severe coronary calcifications. Patient was discussed with hospitalist for transfer, but due to transportation issues, patient was not transported till this morning. She did continue to have chest pain overnight. Her EKG remained stable. In nitroglycerin was placed. On my assessment currently, patient reports a 4 out of 10 chest pain that feels like a \"bruising. \"  She does report some nausea as well as a headache. Please See MDM for Additional Details of the HPI/PMH  Nursing Notes were all reviewed and agreed with or any disagreements were addressed in the HPI.      REVIEW OF SYSTEMS Positives and Pertinent negatives as per HPI. PAST HISTORY     Past Medical History:  Past Medical History:   Diagnosis Date    Family history of benign essential tremor     Hypertension     MS (multiple sclerosis) ([facility] [address]     Neurogenic bladder     Obesity     Vitamin D deficiency        Past Surgical History:  Past Surgical History:   Procedure Laterality Date    HX  SECTION      HX HIP REPLACEMENT      left    HX HYSTERECTOMY         Family History:  No family history on file. Social History:  Social History     Tobacco Use    Smoking status: Former     Types: Cigarettes     Passive exposure: Past    Smokeless tobacco: Never   Vaping Use    Vaping Use: Never used   Substance Use Topics    Alcohol use: Not Currently    Drug use: Yes     Types: Marijuana       Allergies: Allergies   Allergen Reactions    Lyrica [Pregabalin] Anaphylaxis     Pt does not have reaction to gabapentin    Ciprofloxacin Other (comments)     Dizziness and shallow breath       CURRENT MEDICATIONS      Current Discharge Medication List        CONTINUE these medications which have NOT CHANGED    Details   cyclobenzaprine (FLEXERIL) 10 mg tablet TAKE 1 TABLET BY MOUTH THREE TIMES A DAY AS NEEDED      cetirizine (ZyrTEC) 10 mg tablet Take 1 Tablet by mouth daily. Qty: 20 Tablet, Refills: 0      gabapentin (NEURONTIN) 100 mg capsule Take 1 Capsule by mouth three (3) times daily. Max Daily Amount: 300 mg. Qty: 30 Capsule, Refills: 0    Associated Diagnoses: Anaphylaxis, initial encounter      ocrelizumab (Ocrevus) 30 mg/mL soln injection Ocrevus 30 mg/mL intravenous solution   Inject by intravenous route. tiZANidine (ZANAFLEX) 4 mg tablet tizanidine 4 mg tablet   TAKE 1 TABLET BY MOUTH EVERY 8 HOURS AS NEEDED FOR MUSCLE SPASMS      trospium (SANCTURA XL) 60 mg capsule Take 1 Capsule by mouth Daily (before breakfast).   Qty: 90 Capsule, Refills: 3      amantadine HCL (SYMMETREL) 100 mg capsule amantadine HCl 100 mg capsule ascorbic acid, vitamin C, (VITAMIN C) 250 mg tablet Take 250 mg by mouth daily. COVID-19 mRNA Vacc (MODERNA) 100 mcg/0.5 mL susp injection Moderna COVID-19 Vaccine (PF) 100 mcg/0.5 mL intramuscular susp. (EUA)   Inject by intramuscular route.      ergocalciferol (ERGOCALCIFEROL) 1,250 mcg (50,000 unit) capsule Vitamin D2 1,250 mcg (50,000 unit) capsule      hydroCHLOROthiazide (HYDRODIURIL) 25 mg tablet hydrochlorothiazide 25 mg tablet   TAKE 1 TABLET BY MOUTH ONCE DAILY      latanoprost (XALATAN) 0.005 % ophthalmic solution latanoprost 0.005 % eye drops      levothyroxine (SYNTHROID) 50 mcg tablet levothyroxine 50 mcg tablet      lisinopriL (PRINIVIL, ZESTRIL) 40 mg tablet lisinopril 40 mg tablet   TAKE 1 TABLET BY MOUTH DAILY      metFORMIN (GLUCOPHAGE) 1,000 mg tablet 500 mg two (2) times daily (with meals). rosuvastatin (CRESTOR) 20 mg tablet rosuvastatin 20 mg tablet             SCREENINGS               No data recorded         PHYSICAL EXAM      ED Triage Vitals [03/29/23 1153]   ED Encounter Vitals Group      BP (!) 167/99      Pulse (Heart Rate) 86      Resp Rate 17      Temp 98.3 °F (36.8 °C)      Temp src       O2 Sat (%) 99 %      Weight       Height         Physical Exam  Vitals and nursing note reviewed. Constitutional:       Comments: 68-year-old female, resting in bed, no acute distress   HENT:      Head: Normocephalic and atraumatic. Cardiovascular:      Rate and Rhythm: Normal rate and regular rhythm. Pulses:           Radial pulses are 2+ on the right side and 2+ on the left side. Heart sounds: Normal heart sounds. Pulmonary:      Effort: Pulmonary effort is normal.      Breath sounds: Normal breath sounds. Abdominal:      General: Abdomen is flat. Tenderness: There is no abdominal tenderness. Skin:     General: Skin is warm. Neurological:      General: No focal deficit present. Mental Status: She is alert.    Psychiatric:         Mood and Affect: Mood normal.        DIAGNOSTIC RESULTS   LABS:     Recent Results (from the past 12 hour(s))   EKG, 12 LEAD, SUBSEQUENT    Collection Time: 03/29/23  7:40 AM   Result Value Ref Range    Ventricular Rate 66 BPM    Atrial Rate 66 BPM    P-R Interval 182 ms    QRS Duration 136 ms    Q-T Interval 440 ms    QTC Calculation (Bezet) 461 ms    Calculated P Axis 79 degrees    Calculated R Axis -9 degrees    Calculated T Axis 21 degrees    Diagnosis       Normal sinus rhythm  Right bundle branch block  Abnormal ECG  When compared with ECG of 29-MAR-2023 00:04,  MANUAL COMPARISON REQUIRED, DATA IS UNCONFIRMED     TROPONIN-HIGH SENSITIVITY    Collection Time: 03/29/23  7:43 AM   Result Value Ref Range    Troponin-High Sensitivity 6 0 - 51 ng/L   EKG, 12 LEAD, SUBSEQUENT    Collection Time: 03/29/23 12:32 PM   Result Value Ref Range    Ventricular Rate 80 BPM    Atrial Rate 80 BPM    P-R Interval 184 ms    QRS Duration 144 ms    Q-T Interval 420 ms    QTC Calculation (Bezet) 484 ms    Calculated P Axis 54 degrees    Calculated R Axis -38 degrees    Calculated T Axis 23 degrees    Diagnosis       Normal sinus rhythm  Left axis deviation  Right bundle branch block  When compared with ECG of 29-MAR-2023 07:40,  No significant change was found    Confirmed by Naomie Cano ([id number]) on 3/29/2023 5:49:20 PM     TROPONIN-HIGH SENSITIVITY    Collection Time: 03/29/23 12:37 PM   Result Value Ref Range    Troponin-High Sensitivity 5 0 - 51 ng/L   ECHO ADULT COMPLETE    Collection Time: 03/29/23  3:26 PM   Result Value Ref Range    IVSd 3.1 (A) 0.6 - 0.9 cm    LVIDd 1.0 (A) 3.9 - 5.3 cm    LVIDd 3.3 (A) 3.9 - 5.3 cm    LVOT Diameter 2.5 cm    LVPWd 1.4 (A) 0.6 - 0.9 cm    LV Ejection Fraction A4C 62 %    LV EDV A4C 69 mL    LV ESV A4C 26 mL    LVOT Peak Gradient 4 mmHg    LVOT Peak Velocity 1.1 m/s    LA Volume A/L 43 mL    LA Volume 2C 45 22 - 52 mL    LA Volume 4C 38 22 - 52 mL    AV Area by Peak Velocity 3.0 cm2    AV Peak Gradient 13 mmHg AV Peak Velocity 1.8 m/s    MV A Velocity 1.03 m/s    MV E Wave Deceleration Time 163.5 ms    MV E Velocity 0.66 m/s    MV Peak Gradient 5 mmHg    MV Mean Gradient 2 mmHg    MV Max Velocity 1.1 m/s    MV Mean Velocity 0.7 m/s    MV VTI 23.5 cm    Aortic Root 3.8 cm    LV ESV Index A4C 12 mL/m2    LV EDV Index A4C 32 mL/m2    MV E/A 0.64     LA Volume Index A/L 20 16 - 34 mL/m2    LVOT Area 4.9 cm2    LA Volume Index 2C 21 16 - 34 mL/m2    LA Volume Index 4C 18 16 - 34 mL/m2    Ao Root Index 1.78 cm/m2    AV Velocity Ratio 0.61     LUIS E/BSA Peak Velocity 1.4 cm2/m2   GLUCOSE, POC    Collection Time: 03/29/23  5:25 PM   Result Value Ref Range    Glucose (POC) 154 (H) 65 - 117 mg/dL    Performed by Sadaf Tee         EKG: If performed, independent interpretation documented below in the MDM section     RADIOLOGY:  Non-plain film images such as CT, Ultrasound and MRI are read by the radiologist. Plain radiographic images are visualized and preliminarily interpreted by the ED Provider with the findings documented in the MDM section. Interpretation per the Radiologist below, if available at the time of this note:     CTA CHEST W OR W WO CONT    Result Date: 3/28/2023  EXAM: CTA CHEST W OR W WO CONT DATE: 3/28/2023 11:08 PM INDICATION: pleuritic chest pain COMPARISON: None TECHNIQUE: Helical thin section chest CT following uneventful intravenous administration of nonionic contrast 100 mL of isovue 370 according to departmental PE protocol. Coronal and sagittal reformats were performed. 3D post processing was performed. CT dose reduction was achieved through the use of a standardized protocol tailored for this examination and automatic exposure control for dose modulation. FINDINGS: This is a good quality study for the evaluation of pulmonary embolism to the first subsegmental arterial level. There is no pulmonary embolism to this level. CHEST WALL: No axillary or supraclavicular lymphadenopathy. THYROID: No nodule. MEDIASTINUM: No mass or lymphadenopathy. VARSHA: No mass or lymphadenopathy. THORACIC AORTA: No aneurysm. HEART: Prominent. Severe coronary calcifications. ESOPHAGUS: No wall thickening or dilatation. TRACHEA/BRONCHI: Patent. PLEURA: No effusion or pneumothorax. LUNGS: No nodule, mass, or airspace disease. UPPER ABDOMEN: Partially imaged. No acute pathology. BONES: No aggressive bone lesion or fracture. 1.  No evidence of pulmonary embolism or acute airspace disease. 2.  Severe coronary calcifications. XR CHEST PORT    Result Date: 3/28/2023  Clinical indication: Chest pain. Portable AP erect view of the chest obtained, no prior. The heart size is normal. There is no acute infiltrate. impression: No acute infiltrate. ECHO ADULT COMPLETE    Result Date: 3/29/2023    Left Ventricle: Normal left ventricular systolic function with a visually estimated EF of 65%. Left ventricle size is normal. Mildly increased wall thickness. Normal wall motion. Right Ventricle: Right ventricle size is normal. Normal systolic function.         PROCEDURES   Unless otherwise noted below, none  Procedures     CRITICAL CARE TIME   0    EMERGENCY DEPARTMENT COURSE and DIFFERENTIAL DIAGNOSIS/MDM   Vitals:    Vitals:    03/29/23 1300 03/29/23 1507 03/29/23 1554 03/29/23 1705   BP: 128/73 128/73 (!) 161/90 (!) 155/93   Pulse: 78  73 85   Resp: 15  13 15   Temp:    98.1 °F (36.7 °C)   SpO2: 93%  95% 97%   Weight:  109.3 kg (241 lb)     Height:  5' 5\" (1.651 m)          Patient was given the following medications:  Medications   latanoprost (XALATAN) 0.005 % ophthalmic solution 1 Drop (1 Drop Both Eyes Given 3/29/23 1854)   levothyroxine (SYNTHROID) tablet 50 mcg (has no administration in time range)   lisinopriL (PRINIVIL, ZESTRIL) tablet 40 mg (has no administration in time range)   rosuvastatin (CRESTOR) tablet 20 mg (has no administration in time range)   tiZANidine (ZANAFLEX) tablet 4 mg (has no administration in time range) sodium chloride (NS) flush 5-40 mL (10 mL IntraVENous Given 3/29/23 1550)   sodium chloride (NS) flush 5-40 mL (has no administration in time range)   acetaminophen (TYLENOL) tablet 650 mg (has no administration in time range)     Or   acetaminophen (TYLENOL) suppository 650 mg (has no administration in time range)   polyethylene glycol (MIRALAX) packet 17 g (has no administration in time range)   ondansetron (ZOFRAN ODT) tablet 4 mg (has no administration in time range)     Or   ondansetron (ZOFRAN) injection 4 mg (has no administration in time range)   enoxaparin (LOVENOX) injection 30 mg (has no administration in time range)   insulin lispro (HUMALOG) injection (2 Units SubCUTAneous Given 3/29/23 1816)   glucose chewable tablet 16 g (has no administration in time range)   glucagon (GLUCAGEN) injection 1 mg (has no administration in time range)   nitroglycerin (NITROSTAT) tablet 0.4 mg (has no administration in time range)   pantoprazole (PROTONIX) tablet 40 mg (40 mg Oral Given 3/29/23 1550)   gabapentin (NEURONTIN) capsule 100 mg (100 mg Oral Given 3/29/23 1550)   aspirin chewable tablet 81 mg (has no administration in time range)   oxybutynin chloride XL (DITROPAN XL) tablet 10 mg (has no administration in time range)   ketorolac (TORADOL) injection 15 mg (15 mg IntraVENous Given 3/29/23 1238)   ondansetron (ZOFRAN) injection 4 mg (4 mg IntraVENous Given 3/29/23 1238)       Medical Decision Making  47-year-old female presents as a transfer of care from outside hospital for chest pain and concern for unstable angina versus high risk chest pain. Her vitals are unremarkable. Does report chest pain here currently with nitro in place, slightly atypical for ACS. Will repeat EKG and cardiac enzymes. Reviewed outside labs and radiology. Will consult cardiology here.   We will observe in the emergency department and discuss with hospitalist.    Amount and/or Complexity of Data Reviewed  ECG/medicine tests: ordered. Decision-making details documented in ED Course. Risk  Prescription drug management. Decision regarding hospitalization. ED Course as of 03/29/23 1920   Wed Mar 29, 2023   1249 Preliminary EKG interpreted by me. Shows normal sinus rhythm with a HR of 80. No ST elevations. Right bundle branch block. [MB]   26 Consult with Dr. Lilliam Aguero, cardiology, ok to eat with negative troponin. Will admit obs for risk stratification of chest pain. [MB]   3357 D/w hospitalist, Dr. Joseph Nye, for observation for further work-up of chest pain. [MB]      ED Course User Index  [MB] Kelli Flores MD         FINAL IMPRESSION     1. Chest pain, unspecified type    2. Coronary artery disease involving native heart without angina pectoris, unspecified vessel or lesion type          DISPOSITION/PLAN   Ardean Promise  results have been reviewed with her. She has been counseled regarding her diagnosis, treatment, and plan. She verbally conveys understanding and agreement of the signs, symptoms, diagnosis, treatment and prognosis and additionally agrees to follow up as discussed. She also agrees with the care-plan and conveys that all of her questions have been answered. I have also provided discharge instructions for her that include: educational information regarding their diagnosis and treatment, and list of reasons why they would want to return to the ED prior to their follow-up appointment, should her condition change. CLINICAL IMPRESSION    Admitted    I am the Primary Clinician of Record. Ephraim Estrada MD (electronically signed)    (Please note that parts of this dictation were completed with voice recognition software. Quite often unanticipated grammatical, syntax, homophones, and other interpretive errors are inadvertently transcribed by the computer software. Please disregards these errors.  Please excuse any errors that have escaped final proofreading.)

## 2023-03-29 NOTE — PROGRESS NOTES
Contacted [facility] regarding transfer of pt to ED [facility] ED, spoke with Mulugeta Castaneda, arranged for ALS transport with cardiac monitor, saline lock, no isolation, and with the patient on RA. Insurance information, ht/wt, and primary diagnosis provided. Received a 0930 ETA. Stanford Abreu, ED charge RN made aware of ETA.       Called by Carrie Spear for an updated ETA of 1000

## 2023-03-29 NOTE — PROGRESS NOTES
Accepted to hospitalist service at, [address], pending bed assignment. 6hr wait time completed at [facility] Spoke with Linda Siegel, RN at [facility] who states that the patient is still waiting for a bed assignment. 0530 Spoke to Kimberly Marr RN with [facility] who states that the patient is still waiting on a bed assignment.

## 2023-03-29 NOTE — Clinical Note
Status[de-identified] INPATIENT [101]   Type of Bed: Telemetry [19]   Cardiac Monitoring Required?: Yes   Inpatient Hospitalization Certified Necessary for the Following Reasons: 3.  Patient receiving treatment that can only be provided in an inpatient setting (further clarification in H&P documentation)   Admitting Diagnosis: Chest pain [369531]   Admitting Physician: Fran Brooks [[id number]]   Attending Physician: Fran Brooks [[id number]]   Estimated Length of Stay: 2 Midnights   Discharge Plan[de-identified] Home with Office Follow-up

## 2023-03-29 NOTE — PROGRESS NOTES
P&T-Approved DVT Prophylaxis Dosing    Per P&T Committee-approved protocol enoxaparin 40mg daily has been adjusted to enoxaparin 30mg bid based on weight and renal function as shown in the table below.          BURTON Diop

## 2023-03-29 NOTE — ED NOTES
eTRANSFER SBAR NOTE    IP UNIT CALLED NOTE IS READY: Yes Spoke to Zahira Perea    IF there are questions Call transferring nurse (your name) Mayito Osborn at phone # [phone]    SITUATION/BACKGROUND:    Patient is being transferred to \[facility]\"" Medical Oncology, Room# 2282    Patient's Chief Complaint on arrival to ED was chest pain and is admitted for Coronary artery disease involving native heart without angina pectoris, unspecified vessel or lesion type. CODE STATUS: Full Code    VITAL SIGNS (MUST BE WITHIN 1 HOUR OF TRANSFER TO IP UNIT:    TEMP: 98  PULSE: 73  RESP: 18  BP: 161/90  PAIN SCORE: 3  MEWS:      ISOLATION/PRECAUTIONS: No   ISOLATION TYPE:     Called outstanding consults: Yes     Are there sign and held orders that need to be released? No   Are all STAT orders completed: Yes    STAT labs collected: Yes  REPEAT LACTIC ACID DUE? No  TIME DUE:   Critical Labs Results? No  What? Are there any titrating drips? No   If so what? The following personal items will be sent with the patient during transfer to the floor: All valuables:   ITEM:    ITEM:    ITEM:           ASSESSMENT:    CIWA Assessment: No  Last Score:     NEURO:   NIH SCORE:        GABE SCREENING:          NEURO ASSESSMENT:        Is patient impulsive? No   Is patient oriented? Yes   Do they follow commands? Yes  Is the patient ambulatory? Yes Device need: cane    FALL RISK? Yes   Is the [place] Assessment completed? Yes  INTERVENTIONS:     INTEGUMENTARY:   IS THE PATIENT UNDRESSED? Yes  WOUNDS PRESENT? No  On admit to ED No   ARE THE WOUNDS DOCUMENTED? No     RESPIRATORY:   Is patient on Oxygen? No    OXYGEN:    IS patient on VENT? No      CARDIAC:   Is cardiac monitoring ordered? Yes Last Rhythm: NSR  Patient to transfer with tele box on? Yes  Is patient using a LIFE VEST? No     LINE ACCESS:   Peripheral IV 03/29/23 Right Antecubital (Active)        /GI:   CONTINENT BOWEL/BLADDER? Yes  URINARY OUTPUT: voiding   Written Order for Gan Cath?  No CHRONIC OR ACUTE? If CHRONIC, is it 1days old, was it changed prior to specimen collection? No   WAS UA WITH REFLEX SENT TO LAB? No  IF NO,  COLLECT AND SEND PRIOR TO TRANSPORT TO INPATIENT AREA    RESTRAINTS IN USE: No      IS DOCUMENTATION COMPLETE: No   Is there a current Order? No  When does it ?      Additional details as Needed:

## 2023-03-29 NOTE — H&P
Hospitalist Admission Note    NAME: Meme Higgins   :  1965   MRN:  [id number]     Date/Time:  3/29/2023 1:57 PM    Patient PCP: Martita Ramires NP  ______________________________________________________________________  Given the patient's current clinical presentation, I have a high level of concern for decompensation if discharged from the emergency department. Complex decision making was performed, which includes reviewing the patient's available past medical records, laboratory results, and x-ray films. My assessment of this patient's clinical condition and my plan of care is as follows. Assessment / Plan:  Chest pain POA  Hypertension  Diabetes  Hypothyroidism  History of MS  Significant family history for CAD  EKG normal sinus rhythm 80 bpm, ? Right bundle branch block  hsTroponin 6-6-6->5  Chest x-ray negative acute  CTA chest negative for PE, severe coronary calcification noted    Observation on remote telemetry bed  Sublingual nitroglycerin as needed chest pain  Status post aspirin x1 at [facility] ED, continue aspirin 81 mg daily  Continue statin on Crestor at home  Continue home lisinopril  Check 2D echo  Inpatient cardiology consulted by ED-n.p.o. after midnight for stress test in a.m. noted  Fingersticks before every meal and at bedtime, hold home metformin, sliding scale lispro here  Continue home Synthroid  Continue home Zanaflex as needed for muscle spasms  Continue home gabapentin  Trial of PPI    Glaucoma  Continue home eyedrops    Ex-smoker  Neurogenic bladder  Continue oxybutynin    Medical Decision Making:    Labs reviewed by myself   Troponins, CBC, BMP, LFTs    Diagnostic data reviewed by myself   EKG normal sinus rhythm with no acute ST-T wave changes noted    Toxic drug monitoring    none    Given the patient's current clinical presentation, I have a high level of concern for decompensation if discharged from the emergency department.   Patient will be admitted as an observation for cardiac work-up and estimated LOS 24 hrs          Code Status: Full code  Surrogate Decision Maker:     DVT Prophylaxis: Lovenox  GI Prophylaxis: PPI    Baseline: Patient lives with  at home, is a now 6 months out for MS, has a neurogenic bladder      Subjective:   CHIEF COMPLAINT: Chest pain since 1 day    HISTORY OF PRESENT ILLNESS:     Alison Tirado is a 59 y.o.  female who presents with above complaints from home with  at bedside, initially presented to [facility] ER transfer to [facility] ER for cardiac evaluation. Patient presented with chief complaint of chest pain (described as epigastric /substernal , 8 out of 10 , worse with inspiration ) that started yesterday morning, that has since improved after nitro paste at [facility] ED, was being awaiting transfer to [facility] for cardiac work-up, patient was able to sleep, woke up with chest soreness and pain again-at that time patient was transferred to [facility] ED for more urgent cardiac work-up. Patient had unremarkable troponins x 4 in past 24 hours, on room of the EKG, with negative CTA chest for PE or dissection, but was positive for severe coronary calcification. Patient currently chest pain-free when seen by me in the ED at [facility]. We were asked to admit for work up and evaluation of the above problems.      Past Medical History:   Diagnosis Date    Family history of benign essential tremor     Hypertension     MS (multiple sclerosis) ([address]     Neurogenic bladder     Obesity     Vitamin D deficiency         Past Surgical History:   Procedure Laterality Date    HX  SECTION      HX HIP REPLACEMENT      left    HX HYSTERECTOMY         Social History     Tobacco Use    Smoking status: Former     Types: Cigarettes     Passive exposure: Past    Smokeless tobacco: Never   Substance Use Topics    Alcohol use: Not Currently      Family history  CAD runs in family as per patient    Allergies   Allergen Reactions    Lyrica [Pregabalin] Anaphylaxis     Pt does not have reaction to gabapentin    Ciprofloxacin Other (comments)     Dizziness and shallow breath        Prior to Admission medications    Medication Sig Start Date End Date Taking? Authorizing Provider   cetirizine (ZyrTEC) 10 mg tablet Take 1 Tablet by mouth daily. Patient not taking: Reported on 3/28/2023 1/29/23   Zaid Red MD   gabapentin (NEURONTIN) 100 mg capsule Take 1 Capsule by mouth three (3) times daily. Max Daily Amount: 300 mg. 1/29/23   Zaid Red MD   ocrelizumab (Ocrevus) 30 mg/mL soln injection Ocrevus 30 mg/mL intravenous solution   Inject by intravenous route. Provider, Historical   tiZANidine (ZANAFLEX) 4 mg tablet tizanidine 4 mg tablet   TAKE 1 TABLET BY MOUTH EVERY 8 HOURS AS NEEDED FOR MUSCLE SPASMS 8/17/22   Provider, Historical   trospium (SANCTURA XL) 60 mg capsule Take 1 Capsule by mouth Daily (before breakfast). 1/9/23   Tommie Guy MD   amantadine HCL (SYMMETREL) 100 mg capsule amantadine HCl 100 mg capsule  Patient not taking: Reported on 3/28/2023    Provider, Historical   ascorbic acid, vitamin C, (VITAMIN C) 250 mg tablet Take 250 mg by mouth daily. Patient not taking: Reported on 3/28/2023    Provider, Historical   COVID-19 mRNA Vacc (MODERNA) 100 mcg/0.5 mL susp injection Moderna COVID-19 Vaccine (PF) 100 mcg/0.5 mL intramuscular susp. (EUA)   Inject by intramuscular route.     Provider, Historical   ergocalciferol (ERGOCALCIFEROL) 1,250 mcg (50,000 unit) capsule Vitamin D2 1,250 mcg (50,000 unit) capsule    Provider, Historical   hydroCHLOROthiazide (HYDRODIURIL) 25 mg tablet hydrochlorothiazide 25 mg tablet   TAKE 1 TABLET BY MOUTH ONCE DAILY  Patient not taking: Reported on 3/28/2023 6/18/21   Provider, Historical   latanoprost (XALATAN) 0.005 % ophthalmic solution latanoprost 0.005 % eye drops    Provider, Historical   levothyroxine (SYNTHROID) 50 mcg tablet levothyroxine 50 mcg tablet 11/15/21   Provider, Historical   lisinopriL (PRINIVIL, ZESTRIL) 40 mg tablet lisinopril 40 mg tablet   TAKE 1 TABLET BY MOUTH DAILY 9/6/21   Provider, Historical   metFORMIN (GLUCOPHAGE) 1,000 mg tablet 500 mg two (2) times daily (with meals). 10/21/21   Provider, Historical   rosuvastatin (CRESTOR) 20 mg tablet rosuvastatin 20 mg tablet 5/17/21   Provider, Historical       REVIEW OF SYSTEMS:         Total of 12 systems reviewed as follows:       POSITIVE= underlined text  Negative = text not underlined  General:  fever, chills, sweats, generalized weakness, weight loss/gain,      loss of appetite   Eyes:    blurred vision, eye pain, loss of vision, double vision  ENT:    rhinorrhea, pharyngitis   Respiratory:   cough, sputum production, SOB, SPANGLER, wheezing, pleuritic pain   Cardiology:   chest pain, palpitations, orthopnea, PND, edema, syncope   Gastrointestinal:  abdominal pain , N/V, diarrhea, dysphagia, constipation, bleeding   Genitourinary:  frequency, urgency, dysuria, hematuria, incontinence   Muskuloskeletal :  arthralgia, myalgia, back pain  Hematology:  easy bruising, nose or gum bleeding, lymphadenopathy   Dermatological: rash, ulceration, pruritis, color change / jaundice  Endocrine:   hot flashes or polydipsia   Neurological:  headache, dizziness, confusion, focal weakness, paresthesia,     Speech difficulties, memory loss, gait difficulty  Psychological: Feelings of anxiety, depression, agitation    Objective:   VITALS:    Visit Vitals  /73   Pulse 78   Temp 98.3 °F (36.8 °C)   Resp 15   SpO2 93%       PHYSICAL EXAM:    General:    Alert, cooperative, no distress, appears stated age. Obese+  HEENT: Atraumatic, anicteric sclerae, pink conjunctivae     No oral ulcers, mucosa moist, throat clear, dentition fair  Neck:  Supple, symmetrical,  thyroid: non tender  Lungs:   Clear to auscultation bilaterally.   No Wheezing or Rhonchi. No rales. Chest wall:  No tenderness  No Accessory muscle use. Heart:   Regular  rhythm,  No  murmur   No edema  Abdomen:   Soft, non-tender. Not distended. Bowel sounds normal  Extremities: No cyanosis. No clubbing,      Skin turgor normal, Capillary refill normal, Radial dial pulse 2+  Skin:     Not pale. Not Jaundiced  No rashes   Psych:  Good insight. Not depressed. Not anxious or agitated. Neurologic: EOMs intact. No facial asymmetry. No aphasia or slurred speech. Symmetrical strength, Sensation grossly intact. Alert and oriented X 4.     _______________________________________________________________________  Care Plan discussed with:    Comments   Patient x    Family  x  at bedside in ED 25   RN x    Care Manager                    Consultant:      _______________________________________________________________________  Expected  Disposition:   Home with Family x   HH/PT/OT/RN ?   SNF/LTC    DANNY    ________________________________________________________________________  TOTAL TIME:  46 Minutes    Critical Care Provided     Minutes non procedure based      Comments    x Reviewed previous records   >50% of visit spent in counseling and coordination of care x Discussion with patient and family and questions answered       ________________________________________________________________________  Signed: Birdia Pelt, MD    Procedures: see electronic medical records for all procedures/Xrays and details which were not copied into this note but were reviewed prior to creation of Plan.     LAB DATA REVIEWED:    Recent Results (from the past 24 hour(s))   METABOLIC PANEL, COMPREHENSIVE    Collection Time: 03/28/23  9:05 PM   Result Value Ref Range    Sodium 139 136 - 145 mmol/L    Potassium 3.9 3.5 - 5.1 mmol/L    Chloride 101 97 - 108 mmol/L    CO2 26 21 - 32 mmol/L    Anion gap 12 5 - 15 mmol/L    Glucose 90 65 - 100 mg/dL    BUN 23 (H) 6 - 20 MG/DL    Creatinine 1.01 0.55 - 1.02 MG/DL    BUN/Creatinine ratio 23 (H) 12 - 20      eGFR >60 >60 ml/min/1.73m2    Calcium 9.1 8.5 - 10.1 MG/DL    Bilirubin, total 0.7 0.2 - 1.0 MG/DL    ALT (SGPT) 17 12 - 78 U/L    AST (SGOT) 17 15 - 37 U/L    Alk. phosphatase 60 45 - 117 U/L    Protein, total 7.3 6.4 - 8.2 g/dL    Albumin 4.3 3.5 - 5.0 g/dL    Globulin 3.0 2.0 - 4.0 g/dL    A-G Ratio 1.4 1.1 - 2.2     CBC WITH AUTOMATED DIFF    Collection Time: 03/28/23  9:05 PM   Result Value Ref Range    WBC 8.4 3.6 - 11.0 K/uL    RBC 4.08 3.80 - 5.20 M/uL    HGB 12.3 11.5 - 16.0 g/dL    HCT 36.3 35.0 - 47.0 %    MCV 89.0 80.0 - 99.0 FL    MCH 30.1 26.0 - 34.0 PG    MCHC 33.9 30.0 - 36.5 g/dL    RDW 12.1 11.5 - 14.5 %    PLATELET 113 004 - 655 K/uL    MPV 10.1 8.9 - 12.9 FL    NRBC 0.0 0  WBC    ABSOLUTE NRBC 0.00 0.00 - 0.01 K/uL    NEUTROPHILS 77 (H) 32 - 75 %    LYMPHOCYTES 10 (L) 12 - 49 %    MONOCYTES 11 5 - 13 %    EOSINOPHILS 1 0 - 7 %    BASOPHILS 0 0 - 1 %    IMMATURE GRANULOCYTES 1 (H) 0.0 - 0.5 %    ABS. NEUTROPHILS 6.5 1.8 - 8.0 K/UL    ABS. LYMPHOCYTES 0.8 0.8 - 3.5 K/UL    ABS. MONOCYTES 0.9 0.0 - 1.0 K/UL    ABS. EOSINOPHILS 0.1 0.0 - 0.4 K/UL    ABS. BASOPHILS 0.0 0.0 - 0.1 K/UL    ABS. IMM.  GRANS. 0.0 0.00 - 0.04 K/UL    DF AUTOMATED     PROTHROMBIN TIME + INR    Collection Time: 03/28/23  9:05 PM   Result Value Ref Range    INR 0.9 0.9 - 1.1      Prothrombin time 9.4 9.0 - 11.1 sec   D DIMER    Collection Time: 03/28/23  9:05 PM   Result Value Ref Range    D-dimer 0.90 (H) 0.00 - 0.65 mg/L FEU   TROPONIN-HIGH SENSITIVITY    Collection Time: 03/28/23  9:05 PM   Result Value Ref Range    Troponin-High Sensitivity 6 0 - 51 ng/L   TROPONIN-HIGH SENSITIVITY    Collection Time: 03/28/23 11:28 PM   Result Value Ref Range    Troponin-High Sensitivity 6 0 - 51 ng/L   EKG, 12 LEAD, INITIAL    Collection Time: 03/29/23 12:04 AM   Result Value Ref Range    Ventricular Rate 67 BPM    Atrial Rate 67 BPM    P-R Interval 188 ms    QRS Duration 146 ms Q-T Interval 452 ms    QTC Calculation (Bezet) 477 ms    Calculated P Axis 55 degrees    Calculated R Axis -26 degrees    Calculated T Axis 27 degrees    Diagnosis       Normal sinus rhythm  Right bundle branch block  Abnormal ECG  When compared with ECG of 28-MAR-2023 21:02,  MANUAL COMPARISON REQUIRED, DATA IS UNCONFIRMED     EKG, 12 LEAD, SUBSEQUENT    Collection Time: 03/29/23  7:40 AM   Result Value Ref Range    Ventricular Rate 66 BPM    Atrial Rate 66 BPM    P-R Interval 182 ms    QRS Duration 136 ms    Q-T Interval 440 ms    QTC Calculation (Bezet) 461 ms    Calculated P Axis 79 degrees    Calculated R Axis -9 degrees    Calculated T Axis 21 degrees    Diagnosis       Normal sinus rhythm  Right bundle branch block  Abnormal ECG  When compared with ECG of 29-MAR-2023 00:04,  MANUAL COMPARISON REQUIRED, DATA IS UNCONFIRMED     TROPONIN-HIGH SENSITIVITY    Collection Time: 03/29/23  7:43 AM   Result Value Ref Range    Troponin-High Sensitivity 6 0 - 51 ng/L   EKG, 12 LEAD, SUBSEQUENT    Collection Time: 03/29/23 12:32 PM   Result Value Ref Range    Ventricular Rate 80 BPM    Atrial Rate 80 BPM    P-R Interval 184 ms    QRS Duration 144 ms    Q-T Interval 420 ms    QTC Calculation (Bezet) 484 ms    Calculated P Axis 54 degrees    Calculated R Axis -38 degrees    Calculated T Axis 23 degrees    Diagnosis       ** Poor data quality, interpretation may be adversely affected  Normal sinus rhythm  Left axis deviation  Right bundle branch block  When compared with ECG of 29-MAR-2023 07:40,  MANUAL COMPARISON REQUIRED, DATA IS UNCONFIRMED     TROPONIN-HIGH SENSITIVITY    Collection Time: 03/29/23 12:37 PM   Result Value Ref Range    Troponin-High Sensitivity 5 0 - 51 ng/L

## 2023-03-29 NOTE — ED NOTES
07:25  Patient received in transfer pending signout to [facility]. On evaluation she states she is still having pain in the center of her chest but she states is nothing as severe as yesterday. She feels like a sore bruise/pressure centrally and states that has not gone away since arriving. Labs and CT reviewed. Repeat EKG and troponin to be obtained. Blood pressure currently 151/87. Transcutaneous nitroglycerin transdermal to be provided for pain and blood pressure management and morning medicines have been ordered    7:44 AM   Repeat EKG completed. Normal sinus rhythm at a rate of 66 bpm SD; widened QRS consistent block; slight prolongation of the QTc at 461 with left axis deviation. T wave flattening noted in lead III with T wave inversions in V1 through V3.  Unchanged from arrival or repeat EKG at midnight  EKG interpreted by ED provider Ashley Strickland MD

## 2023-03-29 NOTE — ED NOTES
Pt medicated for a 4/10 headache - pain to forehead. Pressure/sharp pain to chest has resolved per pt - \"It just feels like a bruise\" per pt.  Denies SOB/n/v.

## 2023-03-29 NOTE — CONSULTS
Cardiology Consult Note    CC: CP    PCP:Johnny Yan NP  Reason for consult:  CP; cor Ca  Requesting MD:  Dr. Maria Elena Evans Date: 3/29/2023   Today's Date: 3/29/2023      Cardiac Assessment/Plan:   1) CAD: by cor Ca; No MI; atypical CP has resolved; chronic SPANGLER; unremarkable echo. *Rec: MPI tomorrow; OK for d/c after if normal.  2) HTN  3) Dyslipidemia, labs per PCP  4) RBBB; ASx  5) Palpitations: if neg tele, outpt holter via PCP as planned    6) DM  7) Anxiety  8) MS  9) Hypothryoidism. Current cardiac meds: asa; lisinopriol 40; crestor 20. As noted in H&P: \"59 y.o.  female who presents with above complaints from home with  at bedside, initially presented to [facility] ER transfer to [facility] ER for cardiac evaluation. Patient presented with chief complaint of chest pain (described as epigastric /substernal , 8 out of 10 , worse with inspiration ) that started yesterday morning, that has since improved after nitro paste at [facility] ED, was being awaiting transfer to [facility] for cardiac work-up, patient was able to sleep, woke up with chest soreness and pain again-at that time patient was transferred to [facility] ED for more urgent cardiac work-up. Patient had unremarkable troponins x 4 in past 24 hours, on room of the EKG, with negative CTA chest for PE or dissection, but was positive for severe coronary calcification. Patient currently chest pain-free when seen by me in the ED at [facility]. \"    ______________________________________________________________________    The patient reports no exertional chest pain. She has chronic SPANGLER. Intermittent palpitations (skipping or fast, q.day for few seconds; Holter w/ PCP planned).       She has long h/o atypical chest pain; negative stress test 2017; felt better until the last few months which she has had intermittent chest pressure, 0-2 X/ week; usually just a few seconds. She had hours of chest discomfort yesterday which resolved spontaneously. No current complaints. Current echo was unremarkable. No PND, orthopnea, palpitations, pre-syncope, syncope, peripheral edema, or decrease in exercise tolerance. No current complaints. ECG: sinus; RBBB; no acute changes. Tele: sinus  CXR: \"The heart size is normal. There is no acute infiltrate. \"  Chest CTA: \"1. No evidence of pulmonary embolism or acute airspace disease. 2.  Severe coronary calcifications. \"    Notable labs: Nl trop x4. Nl K, Cr, Hg, Plts. Echo 3/29/23:    Left Ventricle: Normal left ventricular systolic function with a visually estimated EF of 65%. Left ventricle size is normal. Mildly increased wall thickness. Normal wall motion. Right Ventricle: Right ventricle size is normal. Normal systolic function.        ______________________________________________________________________  Patient Active Problem List    Diagnosis Date Noted    Elevated liver enzymes 09/28/2020    Lymphocytopenia 07/24/2019    Type 2 diabetes mellitus without complication, without long-term current use of insulin ([facility] [address] 07/22/2019    Anxiety 03/29/2017    Hypertension 02/23/2017    Hyperlipidemia 01/26/2017    Pain of right arm 01/26/2017    Vitamin D deficiency 01/26/2017    Allergic reaction to drug 09/27/2016    Cellulitis of ankle 09/21/2016    Effusion of ankle 09/21/2016    Presence of artificial hip joint 09/21/2016    Insomnia 12/14/2015    Trochanteric bursitis 12/14/2015    Genu varum 09/11/2014    Osteoarthritis of knee 09/11/2014    Bunion 08/14/2014    Hypothyroidism 08/14/2014    Pes planus 08/14/2014    Glaucoma 05/14/2013    Multiple sclerosis ([facility] [address] 05/14/2013    Postmenopausal bleeding 05/07/2013    Right lower quadrant pain 05/07/2013        Past Medical History:   Diagnosis Date    Family history of benign essential tremor     Hypertension     MS (multiple sclerosis) (HCC)     Neurogenic bladder     Obesity Vitamin D deficiency       Past Surgical History:   Procedure Laterality Date    HX  SECTION      HX HIP REPLACEMENT      left    HX HYSTERECTOMY       Allergies   Allergen Reactions    Lyrica [Pregabalin] Anaphylaxis     Pt does not have reaction to gabapentin    Ciprofloxacin Other (comments)     Dizziness and shallow breath      No family history on file. Social History     Socioeconomic History    Marital status:      Spouse name: Not on file    Number of children: Not on file    Years of education: Not on file    Highest education level: Not on file   Occupational History    Not on file   Tobacco Use    Smoking status: Former     Types: Cigarettes     Passive exposure: Past    Smokeless tobacco: Never   Vaping Use    Vaping Use: Never used   Substance and Sexual Activity    Alcohol use: Not Currently    Drug use: Yes     Types: Marijuana    Sexual activity: Yes     Partners: Male   Other Topics Concern    Not on file   Social History Narrative    Not on file     Social Determinants of Health     Financial Resource Strain: Not on file   Food Insecurity: Not on file   Transportation Needs: Not on file   Physical Activity: Not on file   Stress: Not on file   Social Connections: Not on file   Intimate Partner Violence: Not on file   Housing Stability: Not on file     No current facility-administered medications for this encounter. Current Outpatient Medications   Medication Sig    cetirizine (ZyrTEC) 10 mg tablet Take 1 Tablet by mouth daily. (Patient not taking: Reported on 3/28/2023)    gabapentin (NEURONTIN) 100 mg capsule Take 1 Capsule by mouth three (3) times daily. Max Daily Amount: 300 mg.    ocrelizumab (Ocrevus) 30 mg/mL soln injection Ocrevus 30 mg/mL intravenous solution   Inject by intravenous route.     tiZANidine (ZANAFLEX) 4 mg tablet tizanidine 4 mg tablet   TAKE 1 TABLET BY MOUTH EVERY 8 HOURS AS NEEDED FOR MUSCLE SPASMS    trospium (SANCTURA XL) 60 mg capsule Take 1 Capsule by mouth Daily (before breakfast). amantadine HCL (SYMMETREL) 100 mg capsule amantadine HCl 100 mg capsule (Patient not taking: Reported on 3/28/2023)    ascorbic acid, vitamin C, (VITAMIN C) 250 mg tablet Take 250 mg by mouth daily. (Patient not taking: Reported on 3/28/2023)    COVID-19 mRNA Vacc (MODERNA) 100 mcg/0.5 mL susp injection Moderna COVID-19 Vaccine (PF) 100 mcg/0.5 mL intramuscular susp. (EUA)   Inject by intramuscular route.    ergocalciferol (ERGOCALCIFEROL) 1,250 mcg (50,000 unit) capsule Vitamin D2 1,250 mcg (50,000 unit) capsule    hydroCHLOROthiazide (HYDRODIURIL) 25 mg tablet hydrochlorothiazide 25 mg tablet   TAKE 1 TABLET BY MOUTH ONCE DAILY (Patient not taking: Reported on 3/28/2023)    latanoprost (XALATAN) 0.005 % ophthalmic solution latanoprost 0.005 % eye drops    levothyroxine (SYNTHROID) 50 mcg tablet levothyroxine 50 mcg tablet    lisinopriL (PRINIVIL, ZESTRIL) 40 mg tablet lisinopril 40 mg tablet   TAKE 1 TABLET BY MOUTH DAILY    metFORMIN (GLUCOPHAGE) 1,000 mg tablet 500 mg two (2) times daily (with meals). rosuvastatin (CRESTOR) 20 mg tablet rosuvastatin 20 mg tablet        No reported FHx of early CAD or SCD    Prior to Admission Medications:  Prior to Admission medications    Medication Sig Start Date End Date Taking? Authorizing Provider   cetirizine (ZyrTEC) 10 mg tablet Take 1 Tablet by mouth daily. Patient not taking: Reported on 3/28/2023 1/29/23   Juan Carlos Red MD   gabapentin (NEURONTIN) 100 mg capsule Take 1 Capsule by mouth three (3) times daily. Max Daily Amount: 300 mg. 1/29/23   Juan Carlos Red MD   ocrelizumab (Ocrevus) 30 mg/mL soln injection Ocrevus 30 mg/mL intravenous solution   Inject by intravenous route.     Provider, Historical   tiZANidine (ZANAFLEX) 4 mg tablet tizanidine 4 mg tablet   TAKE 1 TABLET BY MOUTH EVERY 8 HOURS AS NEEDED FOR MUSCLE SPASMS 8/17/22   Provider, Historical   trospium (SANCTURA XL) 60 mg capsule Take 1 Capsule by mouth Daily (before breakfast). 1/9/23   Burt Guy MD   amantadine HCL (SYMMETREL) 100 mg capsule amantadine HCl 100 mg capsule  Patient not taking: Reported on 3/28/2023    Provider, Historical   ascorbic acid, vitamin C, (VITAMIN C) 250 mg tablet Take 250 mg by mouth daily. Patient not taking: Reported on 3/28/2023    Provider, Historical   COVID-19 mRNA Vacc (MODERNA) 100 mcg/0.5 mL susp injection Moderna COVID-19 Vaccine (PF) 100 mcg/0.5 mL intramuscular susp. (EUA)   Inject by intramuscular route. Provider, Historical   ergocalciferol (ERGOCALCIFEROL) 1,250 mcg (50,000 unit) capsule Vitamin D2 1,250 mcg (50,000 unit) capsule    Provider, Historical   hydroCHLOROthiazide (HYDRODIURIL) 25 mg tablet hydrochlorothiazide 25 mg tablet   TAKE 1 TABLET BY MOUTH ONCE DAILY  Patient not taking: Reported on 3/28/2023 6/18/21   Provider, Historical   latanoprost (XALATAN) 0.005 % ophthalmic solution latanoprost 0.005 % eye drops    Provider, Historical   levothyroxine (SYNTHROID) 50 mcg tablet levothyroxine 50 mcg tablet 11/15/21   Provider, Historical   lisinopriL (PRINIVIL, ZESTRIL) 40 mg tablet lisinopril 40 mg tablet   TAKE 1 TABLET BY MOUTH DAILY 9/6/21   Provider, Historical   metFORMIN (GLUCOPHAGE) 1,000 mg tablet 500 mg two (2) times daily (with meals).  10/21/21   Provider, Historical   rosuvastatin (CRESTOR) 20 mg tablet rosuvastatin 20 mg tablet 5/17/21   Provider, Historical        Review of Symptoms: As noted in H&P:  \"Total of 12 systems reviewed as follows:                                        POSITIVE= underlined text  Negative = text not underlined  General:                     fever, chills, sweats, generalized weakness, weight loss/gain,                                       loss of appetite   Eyes:                           blurred vision, eye pain, loss of vision, double vision  ENT:                            rhinorrhea, pharyngitis   Respiratory:               cough, sputum production, SOB, SPANGLER, wheezing, pleuritic pain   Cardiology:                chest pain, palpitations, orthopnea, PND, edema, syncope   Gastrointestinal:       abdominal pain , N/V, diarrhea, dysphagia, constipation, bleeding   Genitourinary:           frequency, urgency, dysuria, hematuria, incontinence   Muskuloskeletal :      arthralgia, myalgia, back pain  Hematology:              easy bruising, nose or gum bleeding, lymphadenopathy   Dermatological:         rash, ulceration, pruritis, color change / jaundice  Endocrine:                 hot flashes or polydipsia   Neurological:             headache, dizziness, confusion, focal weakness, paresthesia,                                      Speech difficulties, memory loss, gait difficulty  Psychological:          Feelings of anxiety, depression, agitation\". 24 hr VS reviewed, overall VSSAF  Temp (24hrs), Av.1 °F (36.7 °C), Min:98 °F (36.7 °C), Max:98.3 °F (36.8 °C)    Patient Vitals for the past 8 hrs:   Pulse   23 1300 78   23 1200 91   23 1153 86     Patient Vitals for the past 8 hrs:   Resp   23 1300 15   23 1200 18   23 1153 17     Patient Vitals for the past 8 hrs:   BP   23 1300 128/73   23 1200 (!) 169/95   23 1153 (!) 167/99     No intake or output data in the 24 hours ending 23 1341      Physical Exam (complete single organ system exam)    Cons: The patient is no distress. Appears stated age. HEENT: Normal conjunctivae and palate. No xanthelasma. Neck: Flat JVP without appreciable HJR. Resp: Normal respiratory effort with clear lungs bilaterally. CV: Regular rate and rhythm. PMI not palpated. Normal S1,S2  No gallop or rubs appreciated. No murmur appreciated. Intact carotid upstroke bilaterally without appreciated bruits. Abdominal aorta not palpated; no abdominal bruit noted. Normal pedal pulses. No peripheral edema. GI: No abd mass noted, soft; no organomegaly noted.   Bowel sounds present. Muscular:  No significant kyphosis. Strength WNL for age. Ext: No cyanosis, clubbing, or stigmata of peripheral embolization. Derm: No ulcers or stasis dermatitis of lower extremities. Neuro: Alert and oriented x 3;  Grossly non-focal. Normal mood and affect. Labs:   Recent Results (from the past 24 hour(s))   METABOLIC PANEL, COMPREHENSIVE    Collection Time: 03/28/23  9:05 PM   Result Value Ref Range    Sodium 139 136 - 145 mmol/L    Potassium 3.9 3.5 - 5.1 mmol/L    Chloride 101 97 - 108 mmol/L    CO2 26 21 - 32 mmol/L    Anion gap 12 5 - 15 mmol/L    Glucose 90 65 - 100 mg/dL    BUN 23 (H) 6 - 20 MG/DL    Creatinine 1.01 0.55 - 1.02 MG/DL    BUN/Creatinine ratio 23 (H) 12 - 20      eGFR >60 >60 ml/min/1.73m2    Calcium 9.1 8.5 - 10.1 MG/DL    Bilirubin, total 0.7 0.2 - 1.0 MG/DL    ALT (SGPT) 17 12 - 78 U/L    AST (SGOT) 17 15 - 37 U/L    Alk. phosphatase 60 45 - 117 U/L    Protein, total 7.3 6.4 - 8.2 g/dL    Albumin 4.3 3.5 - 5.0 g/dL    Globulin 3.0 2.0 - 4.0 g/dL    A-G Ratio 1.4 1.1 - 2.2     CBC WITH AUTOMATED DIFF    Collection Time: 03/28/23  9:05 PM   Result Value Ref Range    WBC 8.4 3.6 - 11.0 K/uL    RBC 4.08 3.80 - 5.20 M/uL    HGB 12.3 11.5 - 16.0 g/dL    HCT 36.3 35.0 - 47.0 %    MCV 89.0 80.0 - 99.0 FL    MCH 30.1 26.0 - 34.0 PG    MCHC 33.9 30.0 - 36.5 g/dL    RDW 12.1 11.5 - 14.5 %    PLATELET 005 288 - 925 K/uL    MPV 10.1 8.9 - 12.9 FL    NRBC 0.0 0  WBC    ABSOLUTE NRBC 0.00 0.00 - 0.01 K/uL    NEUTROPHILS 77 (H) 32 - 75 %    LYMPHOCYTES 10 (L) 12 - 49 %    MONOCYTES 11 5 - 13 %    EOSINOPHILS 1 0 - 7 %    BASOPHILS 0 0 - 1 %    IMMATURE GRANULOCYTES 1 (H) 0.0 - 0.5 %    ABS. NEUTROPHILS 6.5 1.8 - 8.0 K/UL    ABS. LYMPHOCYTES 0.8 0.8 - 3.5 K/UL    ABS. MONOCYTES 0.9 0.0 - 1.0 K/UL    ABS. EOSINOPHILS 0.1 0.0 - 0.4 K/UL    ABS. BASOPHILS 0.0 0.0 - 0.1 K/UL    ABS. IMM.  GRANS. 0.0 0.00 - 0.04 K/UL    DF AUTOMATED     PROTHROMBIN TIME + INR    Collection Time: 03/28/23  9:05 PM   Result Value Ref Range    INR 0.9 0.9 - 1.1      Prothrombin time 9.4 9.0 - 11.1 sec   D DIMER    Collection Time: 03/28/23  9:05 PM   Result Value Ref Range    D-dimer 0.90 (H) 0.00 - 0.65 mg/L FEU   TROPONIN-HIGH SENSITIVITY    Collection Time: 03/28/23  9:05 PM   Result Value Ref Range    Troponin-High Sensitivity 6 0 - 51 ng/L   TROPONIN-HIGH SENSITIVITY    Collection Time: 03/28/23 11:28 PM   Result Value Ref Range    Troponin-High Sensitivity 6 0 - 51 ng/L   EKG, 12 LEAD, INITIAL    Collection Time: 03/29/23 12:04 AM   Result Value Ref Range    Ventricular Rate 67 BPM    Atrial Rate 67 BPM    P-R Interval 188 ms    QRS Duration 146 ms    Q-T Interval 452 ms    QTC Calculation (Bezet) 477 ms    Calculated P Axis 55 degrees    Calculated R Axis -26 degrees    Calculated T Axis 27 degrees    Diagnosis       Normal sinus rhythm  Right bundle branch block  Abnormal ECG  When compared with ECG of 28-MAR-2023 21:02,  MANUAL COMPARISON REQUIRED, DATA IS UNCONFIRMED     EKG, 12 LEAD, SUBSEQUENT    Collection Time: 03/29/23  7:40 AM   Result Value Ref Range    Ventricular Rate 66 BPM    Atrial Rate 66 BPM    P-R Interval 182 ms    QRS Duration 136 ms    Q-T Interval 440 ms    QTC Calculation (Bezet) 461 ms    Calculated P Axis 79 degrees    Calculated R Axis -9 degrees    Calculated T Axis 21 degrees    Diagnosis       Normal sinus rhythm  Right bundle branch block  Abnormal ECG  When compared with ECG of 29-MAR-2023 00:04,  MANUAL COMPARISON REQUIRED, DATA IS UNCONFIRMED     TROPONIN-HIGH SENSITIVITY    Collection Time: 03/29/23  7:43 AM   Result Value Ref Range    Troponin-High Sensitivity 6 0 - 51 ng/L   EKG, 12 LEAD, SUBSEQUENT    Collection Time: 03/29/23 12:32 PM   Result Value Ref Range    Ventricular Rate 80 BPM    Atrial Rate 80 BPM    P-R Interval 184 ms    QRS Duration 144 ms    Q-T Interval 420 ms    QTC Calculation (Bezet) 484 ms    Calculated P Axis 54 degrees    Calculated R Axis -38 degrees    Calculated T Axis 23 degrees    Diagnosis       ** Poor data quality, interpretation may be adversely affected  Normal sinus rhythm  Left axis deviation  Right bundle branch block  When compared with ECG of 29-MAR-2023 07:40,  MANUAL COMPARISON REQUIRED, DATA IS UNCONFIRMED     TROPONIN-HIGH SENSITIVITY    Collection Time: 03/29/23 12:37 PM   Result Value Ref Range    Troponin-High Sensitivity 5 0 - 51 ng/L     No results for input(s): CPK, CKMB, CKNDX, TROIQ in the last 72 hours.     Recent Labs     03/28/23  2105      K 3.9      CO2 26   BUN 23*   CREA 1.01   GLU 90   CA 9.1   ALB 4.3   WBC 8.4   HGB 12.3   HCT 36.3          Vee Mcgee MD

## 2023-03-29 NOTE — ED PROVIDER NOTES
[facility] EMERGENCY DEP  EMERGENCY DEPARTMENT ENCOUNTER       Pt Name: Brad Moseley  MRN: [id number]  [place] 1965  Date of evaluation: 3/28/2023  Provider: Pritesh Fischer MD   PCP: Nga Turner NP  Note Started: 8:55 PM 3/28/23     CHIEF COMPLAINT       Chief Complaint   Patient presents with    Chest Pain        HISTORY OF PRESENT ILLNESS: 1 or more elements      History From: Patient  HPI Limitations : None     Brad Moseley is a 59 y.o. female with a h/o MS, HTN, DM II, HLD, hypothyroidism who presents to the ED c/o fatigue. Pt states that she woke up this morning \"feeling off\". She states that she has been fatigued all day and has been experiencing chest pressure x 12 hours so she laid down for a nap. Upon waking, she the midsternal chest pressure worsened. Pt reports sharp pain with deep inspiration. No h/o PE/DVT. Denies fever, chills, cough, SOB, abdominal pain, recent travel/hospitalization/surgeries or calf pain. No prior h/o MI. Father had an MI in his 63's and brother had a quadruple bypass. Nursing Notes were all reviewed and agreed with or any disagreements were addressed in the HPI. REVIEW OF SYSTEMS      Review of Systems   Cardiovascular:  Positive for chest pain. Gastrointestinal:  Positive for nausea. All other systems reviewed and are negative. Positives and Pertinent negatives as per HPI. PAST HISTORY     Past Medical History:  Past Medical History:   Diagnosis Date    Family history of benign essential tremor     Hypertension     MS (multiple sclerosis) ([facility] [address]     Neurogenic bladder     Obesity     Vitamin D deficiency        Past Surgical History:  Past Surgical History:   Procedure Laterality Date    HX  SECTION      HX HIP REPLACEMENT      left    HX HYSTERECTOMY         Family History:  History reviewed. No pertinent family history.     Social History:  Social History     Tobacco Use    Smoking status: Former     Types: Cigarettes     Passive exposure: Past    Smokeless tobacco: Never   Vaping Use    Vaping Use: Never used   Substance Use Topics    Alcohol use: Not Currently    Drug use: Yes     Types: Marijuana       Allergies: Allergies   Allergen Reactions    Lyrica [Pregabalin] Anaphylaxis    Ciprofloxacin Other (comments)     Dizziness and shallow breath       CURRENT MEDICATIONS      Previous Medications    AMANTADINE HCL (SYMMETREL) 100 MG CAPSULE    amantadine HCl 100 mg capsule    ASCORBIC ACID, VITAMIN C, (VITAMIN C) 250 MG TABLET    Take 250 mg by mouth daily. CETIRIZINE (ZYRTEC) 10 MG TABLET    Take 1 Tablet by mouth daily. COVID-19 MRNA VACC (MODERNA) 100 MCG/0.5 ML SUSP INJECTION    Moderna COVID-19 Vaccine (PF) 100 mcg/0.5 mL intramuscular susp. (EUA)   Inject by intramuscular route. ERGOCALCIFEROL (ERGOCALCIFEROL) 1,250 MCG (50,000 UNIT) CAPSULE    Vitamin D2 1,250 mcg (50,000 unit) capsule    GABAPENTIN (NEURONTIN) 100 MG CAPSULE    Take 1 Capsule by mouth three (3) times daily. Max Daily Amount: 300 mg. HYDROCHLOROTHIAZIDE (HYDRODIURIL) 25 MG TABLET    hydrochlorothiazide 25 mg tablet   TAKE 1 TABLET BY MOUTH ONCE DAILY    LATANOPROST (XALATAN) 0.005 % OPHTHALMIC SOLUTION    latanoprost 0.005 % eye drops    LEVOTHYROXINE (SYNTHROID) 50 MCG TABLET    levothyroxine 50 mcg tablet    LISINOPRIL (PRINIVIL, ZESTRIL) 40 MG TABLET    lisinopril 40 mg tablet   TAKE 1 TABLET BY MOUTH DAILY    METFORMIN (GLUCOPHAGE) 1,000 MG TABLET    500 mg two (2) times daily (with meals). OCRELIZUMAB (OCREVUS) 30 MG/ML SOLN INJECTION    Ocrevus 30 mg/mL intravenous solution   Inject by intravenous route. ROSUVASTATIN (CRESTOR) 20 MG TABLET    rosuvastatin 20 mg tablet    TIZANIDINE (ZANAFLEX) 4 MG TABLET    tizanidine 4 mg tablet   TAKE 1 TABLET BY MOUTH EVERY 8 HOURS AS NEEDED FOR MUSCLE SPASMS    TROSPIUM (SANCTURA XL) 60 MG CAPSULE    Take 1 Capsule by mouth Daily (before breakfast).        PHYSICAL EXAM      ED Triage Vitals   ED Encounter Vitals Group      BP       Pulse       Resp       Temp       Temp src       SpO2       Weight       Height         Physical Exam  Vitals and nursing note reviewed. Constitutional:       General: She is not in acute distress. Appearance: Normal appearance. HENT:      Head: Normocephalic and atraumatic. Mouth/Throat:      Mouth: Mucous membranes are moist.   Eyes:      Extraocular Movements: Extraocular movements intact. Conjunctiva/sclera: Conjunctivae normal.   Cardiovascular:      Rate and Rhythm: Normal rate and regular rhythm. Pulses: Normal pulses. Heart sounds: Normal heart sounds. Pulmonary:      Effort: Pulmonary effort is normal. No respiratory distress. Breath sounds: Normal breath sounds. No wheezing or rales. Abdominal:      General: Abdomen is flat. Bowel sounds are normal. There is no distension. Palpations: Abdomen is soft. Tenderness: There is no abdominal tenderness. There is no guarding. Musculoskeletal:         General: No swelling. Normal range of motion. Cervical back: Normal range of motion and neck supple. Skin:     General: Skin is warm and dry. Capillary Refill: Capillary refill takes less than 2 seconds. Neurological:      General: No focal deficit present. Mental Status: She is alert and oriented to person, place, and time.    Psychiatric:         Mood and Affect: Mood normal.         Behavior: Behavior normal.          DIAGNOSTIC RESULTS   LABS:     Recent Results (from the past 12 hour(s))   METABOLIC PANEL, COMPREHENSIVE    Collection Time: 03/28/23  9:05 PM   Result Value Ref Range    Sodium 139 136 - 145 mmol/L    Potassium 3.9 3.5 - 5.1 mmol/L    Chloride 101 97 - 108 mmol/L    CO2 26 21 - 32 mmol/L    Anion gap 12 5 - 15 mmol/L    Glucose 90 65 - 100 mg/dL    BUN 23 (H) 6 - 20 MG/DL    Creatinine 1.01 0.55 - 1.02 MG/DL    BUN/Creatinine ratio 23 (H) 12 - 20      eGFR >60 >60 ml/min/1.73m2    Calcium 9.1 8.5 - 10.1 MG/DL    Bilirubin, total 0.7 0.2 - 1.0 MG/DL    ALT (SGPT) 17 12 - 78 U/L    AST (SGOT) 17 15 - 37 U/L    Alk. phosphatase 60 45 - 117 U/L    Protein, total 7.3 6.4 - 8.2 g/dL    Albumin 4.3 3.5 - 5.0 g/dL    Globulin 3.0 2.0 - 4.0 g/dL    A-G Ratio 1.4 1.1 - 2.2     CBC WITH AUTOMATED DIFF    Collection Time: 03/28/23  9:05 PM   Result Value Ref Range    WBC 8.4 3.6 - 11.0 K/uL    RBC 4.08 3.80 - 5.20 M/uL    HGB 12.3 11.5 - 16.0 g/dL    HCT 36.3 35.0 - 47.0 %    MCV 89.0 80.0 - 99.0 FL    MCH 30.1 26.0 - 34.0 PG    MCHC 33.9 30.0 - 36.5 g/dL    RDW 12.1 11.5 - 14.5 %    PLATELET 044 276 - 773 K/uL    MPV 10.1 8.9 - 12.9 FL    NRBC 0.0 0  WBC    ABSOLUTE NRBC 0.00 0.00 - 0.01 K/uL    NEUTROPHILS 77 (H) 32 - 75 %    LYMPHOCYTES 10 (L) 12 - 49 %    MONOCYTES 11 5 - 13 %    EOSINOPHILS 1 0 - 7 %    BASOPHILS 0 0 - 1 %    IMMATURE GRANULOCYTES 1 (H) 0.0 - 0.5 %    ABS. NEUTROPHILS 6.5 1.8 - 8.0 K/UL    ABS. LYMPHOCYTES 0.8 0.8 - 3.5 K/UL    ABS. MONOCYTES 0.9 0.0 - 1.0 K/UL    ABS. EOSINOPHILS 0.1 0.0 - 0.4 K/UL    ABS. BASOPHILS 0.0 0.0 - 0.1 K/UL    ABS. IMM.  GRANS. 0.0 0.00 - 0.04 K/UL    DF AUTOMATED     PROTHROMBIN TIME + INR    Collection Time: 03/28/23  9:05 PM   Result Value Ref Range    INR 0.9 0.9 - 1.1      Prothrombin time 9.4 9.0 - 11.1 sec   D DIMER    Collection Time: 03/28/23  9:05 PM   Result Value Ref Range    D-dimer 0.90 (H) 0.00 - 0.65 mg/L FEU   TROPONIN-HIGH SENSITIVITY    Collection Time: 03/28/23  9:05 PM   Result Value Ref Range    Troponin-High Sensitivity 6 0 - 51 ng/L   TROPONIN-HIGH SENSITIVITY    Collection Time: 03/28/23 11:28 PM   Result Value Ref Range    Troponin-High Sensitivity 6 0 - 51 ng/L   EKG, 12 LEAD, INITIAL    Collection Time: 03/29/23 12:04 AM   Result Value Ref Range    Ventricular Rate 67 BPM    Atrial Rate 67 BPM    P-R Interval 188 ms    QRS Duration 146 ms    Q-T Interval 452 ms    QTC Calculation (Bezet) 477 ms    Calculated P Axis 55 degrees    Calculated R Axis -26 degrees    Calculated T Axis 27 degrees    Diagnosis       Normal sinus rhythm  Right bundle branch block  Abnormal ECG  When compared with ECG of 28-MAR-2023 21:02,  MANUAL COMPARISON REQUIRED, DATA IS UNCONFIRMED          EKG: normal sinus rhythm, rate 85, , Qtc 497, RBBB, no acute st abnormalities  As interpreted by Dr. Brandon Aldridge    Repeat EKG 12:04: normal sinus rhythm, rate 67, , Qtc 477, RBBB, no acute st abnormalities  As interpreted by Dr. Suzi Torresnut:  Non-plain film images such as CT, Ultrasound and MRI are read by the radiologist. Plain radiographic images are visualized and preliminarily interpreted by the ED Provider with the below findings:     No acute findings     Interpretation per the Radiologist below, if available at the time of this note:     CTA CHEST W OR W WO CONT    Result Date: 3/28/2023  EXAM: CTA CHEST W OR W WO CONT DATE: 3/28/2023 11:08 PM INDICATION: pleuritic chest pain COMPARISON: None TECHNIQUE: Helical thin section chest CT following uneventful intravenous administration of nonionic contrast 100 mL of isovue 370 according to departmental PE protocol. Coronal and sagittal reformats were performed. 3D post processing was performed. CT dose reduction was achieved through the use of a standardized protocol tailored for this examination and automatic exposure control for dose modulation. FINDINGS: This is a good quality study for the evaluation of pulmonary embolism to the first subsegmental arterial level. There is no pulmonary embolism to this level. CHEST WALL: No axillary or supraclavicular lymphadenopathy. THYROID: No nodule. MEDIASTINUM: No mass or lymphadenopathy. VARSHA: No mass or lymphadenopathy. THORACIC AORTA: No aneurysm. HEART: Prominent. Severe coronary calcifications. ESOPHAGUS: No wall thickening or dilatation. TRACHEA/BRONCHI: Patent. PLEURA: No effusion or pneumothorax. LUNGS: No nodule, mass, or airspace disease.  UPPER ABDOMEN: Partially imaged. No acute pathology. BONES: No aggressive bone lesion or fracture. 1.  No evidence of pulmonary embolism or acute airspace disease. 2.  Severe coronary calcifications. XR CHEST PORT    Result Date: 3/28/2023  Clinical indication: Chest pain. Portable AP erect view of the chest obtained, no prior. The heart size is normal. There is no acute infiltrate. impression: No acute infiltrate. PROCEDURES   Unless otherwise noted below, none  Procedures     CRITICAL CARE TIME       EMERGENCY DEPARTMENT COURSE and DIFFERENTIAL DIAGNOSIS/MDM   Vitals:    Vitals:    03/29/23 0101 03/29/23 0116 03/29/23 0131 03/29/23 0132   BP: (!) 151/82 (!) 146/91  (!) 159/88   Pulse: 67  67 66   Resp: 13  12 11   Temp:       SpO2: 92%  96% 95%   Weight:       Height:            Patient was given the following medications:  Medications   morphine injection 4 mg (4 mg IntraVENous Given 3/28/23 2126)   aspirin tablet 325 mg (325 mg Oral Given 3/28/23 2125)   ondansetron (ZOFRAN) injection 4 mg (4 mg IntraVENous Given 3/28/23 2125)   iopamidoL (ISOVUE-370) 370 mg iodine /mL (76 %) injection 100 mL (100 mL IntraVENous Given 3/28/23 2309)       CONSULTS: (Who and What was discussed)  None    Chronic Conditions: HTN, DM II, HLD, MS    Social Determinants affecting Dx or Tx: None    Records Reviewed (source and summary of external notes): Prior medical records, Previous Radiology studies, Previous Laboratory studies, Previous EKGs, and Nursing notes    CC/HPI Summary, DDx, ED Course, and Reassessment:    ED Course as of 03/29/23 0140   Tue Mar 28, 2023   2147 Pt with nausea and chest pressure x 12 hours. EKG is negative for acute ischemia. Pt given PO ASA, IV zofran and morphine without resolution of nausea and CP. [SJ]   2354 Due to pleuritic nature of pain, CTA chest done to r/o PE. No PE, but severe coronary calcifications noted. Given risk factors and CT findings, sx concerning for ACS.  Will transfer to [address] [facility] for cardiac workup. [SJ]   Wed Mar 29, 2023   0033 Repeat troponin is unchanged. [SJ]   0139 Dr. Nallely Colvin has accepted transfer. [SJ]      ED Course User Index  [SJ] Jose Whatley MD           FINAL IMPRESSION     1. Unstable angina [facility])          DISPOSITION/PLAN   Transferred to Another Facility    Transfer: The patient is being transferred to [address] [place] [address] for unstable angina. The results of their tests and reasons for their transfer have been discussed with the patient and/or available family. The patient/family has conveyed agreement and understanding for the need to be admitted and for their admission diagnosis. Consultation has been made with Dr. Nallely Colvin, who agrees to accept the transfer. PATIENT REFERRED TO:  Follow-up Information    None           DISCHARGE MEDICATIONS:  Current Discharge Medication List            DISCONTINUED MEDICATIONS:  Current Discharge Medication List          I am the Primary Clinician of Record.    Dela Gosselin, MD (electronically signed)

## 2023-03-30 ENCOUNTER — APPOINTMENT (OUTPATIENT)
Dept: NON INVASIVE DIAGNOSTICS | Age: 58
End: 2023-03-30
Attending: INTERNAL MEDICINE
Payer: COMMERCIAL

## 2023-03-30 ENCOUNTER — APPOINTMENT (OUTPATIENT)
Dept: NUCLEAR MEDICINE | Age: 58
End: 2023-03-30
Attending: INTERNAL MEDICINE
Payer: COMMERCIAL

## 2023-03-30 VITALS
TEMPERATURE: 97.7 F | WEIGHT: 241 LBS | OXYGEN SATURATION: 98 % | DIASTOLIC BLOOD PRESSURE: 81 MMHG | BODY MASS INDEX: 40.15 KG/M2 | HEART RATE: 71 BPM | SYSTOLIC BLOOD PRESSURE: 146 MMHG | HEIGHT: 65 IN | RESPIRATION RATE: 16 BRPM

## 2023-03-30 LAB
CHOLEST SERPL-MCNC: 128 MG/DL
GLUCOSE BLD STRIP.AUTO-MCNC: 110 MG/DL (ref 65–117)
GLUCOSE BLD STRIP.AUTO-MCNC: 122 MG/DL (ref 65–117)
HDLC SERPL-MCNC: 53 MG/DL
HDLC SERPL: 2.4 (ref 0–5)
LDLC SERPL CALC-MCNC: 60.4 MG/DL (ref 0–100)
SERVICE CMNT-IMP: ABNORMAL
SERVICE CMNT-IMP: NORMAL
STRESS ANGINA INDEX: 0
STRESS BASELINE DIAS BP: 87 MMHG
STRESS BASELINE HR: 64 BPM
STRESS BASELINE ST DEPRESSION: 0 MM
STRESS BASELINE SYS BP: 179 MMHG
STRESS ESTIMATED WORKLOAD: 7 METS
STRESS EXERCISE DUR MIN: 5 MIN
STRESS EXERCISE DUR SEC: 0 SEC
STRESS O2 SAT PEAK: 100 %
STRESS O2 SAT REST: 98 %
STRESS PEAK DIAS BP: 90 MMHG
STRESS PEAK SYS BP: 192 MMHG
STRESS PERCENT HR ACHIEVED: 98 %
STRESS POST PEAK HR: 157 BPM
STRESS RATE PRESSURE PRODUCT: NORMAL BPM*MMHG
STRESS TARGET HR: 161 BPM
TRIGL SERPL-MCNC: 73 MG/DL (ref ?–150)
TSH SERPL DL<=0.05 MIU/L-ACNC: 2.27 UIU/ML (ref 0.36–3.74)
VLDLC SERPL CALC-MCNC: 14.6 MG/DL

## 2023-03-30 PROCEDURE — 74011250637 HC RX REV CODE- 250/637: Performed by: INTERNAL MEDICINE

## 2023-03-30 PROCEDURE — 84443 ASSAY THYROID STIM HORMONE: CPT

## 2023-03-30 PROCEDURE — 80061 LIPID PANEL: CPT

## 2023-03-30 PROCEDURE — 74011250636 HC RX REV CODE- 250/636: Performed by: INTERNAL MEDICINE

## 2023-03-30 PROCEDURE — A9500 TC99M SESTAMIBI: HCPCS

## 2023-03-30 PROCEDURE — 93017 CV STRESS TEST TRACING ONLY: CPT

## 2023-03-30 PROCEDURE — 96372 THER/PROPH/DIAG INJ SC/IM: CPT

## 2023-03-30 PROCEDURE — 36415 COLL VENOUS BLD VENIPUNCTURE: CPT

## 2023-03-30 PROCEDURE — G0378 HOSPITAL OBSERVATION PER HR: HCPCS

## 2023-03-30 PROCEDURE — 82962 GLUCOSE BLOOD TEST: CPT

## 2023-03-30 RX ORDER — GUAIFENESIN 100 MG/5ML
81 LIQUID (ML) ORAL DAILY
Qty: 30 TABLET | Refills: 0 | Status: SHIPPED | OUTPATIENT
Start: 2023-03-31

## 2023-03-30 RX ORDER — PANTOPRAZOLE SODIUM 40 MG/1
40 TABLET, DELAYED RELEASE ORAL
Qty: 30 TABLET | Refills: 0 | Status: SHIPPED | OUTPATIENT
Start: 2023-03-31

## 2023-03-30 RX ORDER — TETRAKIS(2-METHOXYISOBUTYLISOCYANIDE)COPPER(I) TETRAFLUOROBORATE 1 MG/ML
30.7 INJECTION, POWDER, LYOPHILIZED, FOR SOLUTION INTRAVENOUS
Status: COMPLETED | OUTPATIENT
Start: 2023-03-30 | End: 2023-03-30

## 2023-03-30 RX ORDER — TETRAKIS(2-METHOXYISOBUTYLISOCYANIDE)COPPER(I) TETRAFLUOROBORATE 1 MG/ML
10.6 INJECTION, POWDER, LYOPHILIZED, FOR SOLUTION INTRAVENOUS
Status: COMPLETED | OUTPATIENT
Start: 2023-03-30 | End: 2023-03-30

## 2023-03-30 RX ADMIN — GABAPENTIN 100 MG: 100 CAPSULE ORAL at 08:27

## 2023-03-30 RX ADMIN — LISINOPRIL 40 MG: 40 TABLET ORAL at 08:27

## 2023-03-30 RX ADMIN — LEVOTHYROXINE SODIUM 50 MCG: 0.05 TABLET ORAL at 06:04

## 2023-03-30 RX ADMIN — PANTOPRAZOLE SODIUM 40 MG: 40 TABLET, DELAYED RELEASE ORAL at 08:27

## 2023-03-30 RX ADMIN — ENOXAPARIN SODIUM 30 MG: 100 INJECTION SUBCUTANEOUS at 08:27

## 2023-03-30 RX ADMIN — OXYBUTYNIN CHLORIDE 10 MG: 5 TABLET, EXTENDED RELEASE ORAL at 08:27

## 2023-03-30 RX ADMIN — ASPIRIN 81 MG: 81 TABLET, CHEWABLE ORAL at 08:27

## 2023-03-30 RX ADMIN — TETRAKIS(2-METHOXYISOBUTYLISOCYANIDE)COPPER(I) TETRAFLUOROBORATE 10.6 MILLICURIE: 1 INJECTION, POWDER, LYOPHILIZED, FOR SOLUTION INTRAVENOUS at 08:00

## 2023-03-30 RX ADMIN — TETRAKIS(2-METHOXYISOBUTYLISOCYANIDE)COPPER(I) TETRAFLUOROBORATE 30.7 MILLICURIE: 1 INJECTION, POWDER, LYOPHILIZED, FOR SOLUTION INTRAVENOUS at 10:05

## 2023-03-30 NOTE — PROGRESS NOTES
End of Shift Note    Bedside shift change report given to Mayela Hudson (oncoming nurse) by Gurdeep Bowen RN (offgoing nurse). Report included the following information SBAR, Kardex, MAR, and Cardiac Rhythm Sinus Rhythm    Shift worked:  7 am to 7:30 pm     Shift summary and any significant changes:     Patient admitted to unit at 46. Vitals, dual skin, and assessment completed. All scheduled medications administered, see MAR. Patient denied pain and nausea during shift. Patient aware of need to be NPO at midnight for cardiac stress test tomorrow (3/30). Patient  at bedside during transfer, but left prior to shift change. IV flushed and patent. Nursing rounds and education completed. Concerns for physician to address:       Zone phone for oncoming shift:          Activity:  Activity Level: Up with Assistance  Number times ambulated in hallways past shift: 0  Number of times OOB to chair past shift: 0    Cardiac:   Cardiac Monitoring: Yes      Cardiac Rhythm: Sinus Rhythm    Access:  Current line(s): PIV     Genitourinary:   Urinary status: voiding    Respiratory:   O2 Device: None (Room air)  Chronic home O2 use?: NO  Incentive spirometer at bedside: NO       GI:  Last Bowel Movement Date: 03/28/23 (per pt)  Current diet:  DIET NPO  ADULT DIET Regular; 4 carb choices (60 gm/meal);  Low Fat/Low Chol/High Fiber/2 gm Na  DIET ONE TIME MESSAGE  Passing flatus: YES  Tolerating current diet: YES       Pain Management:   Patient states pain is manageable on current regimen: YES    Skin:  Jeff Score: 19  Interventions: increase time out of bed    Patient Safety:  Fall Score:    Interventions: gripper socks and pt to call before getting OOB       Length of Stay:  Expected LOS: - - -  Actual LOS: 200 Ambrosio Haque RN

## 2023-03-30 NOTE — DISCHARGE INSTRUCTIONS
HOSPITALIST DISCHARGE INSTRUCTIONS    NAME: Alok Ulloa   :  1965   MRN:  [id number]     Date/Time:  3/30/2023 1:40 PM    ADMIT DATE: 3/29/2023     DISCHARGE DATE: 3/30/2023     DISCHARGE DIAGNOSIS:  Atypical Chest pain POA- cleared by cardiology after normal Echo and negative stress test, cont baby Aspirin for primary prevention  Hypertension  Diabetes  Hypothyroidism  History of MS  Neurogenic bladder  Glaucoma  Ex-smoker  Full code    MEDICATIONS:  As per medication reconciliation  list  It is important that you take the medication exactly as they are prescribed. Keep your medication in the bottles provided by the pharmacist and keep a list of the medication names, dosages, and times to be taken in your wallet. Do not take other medications without consulting your doctor. Pain Management: per above medications    What to do at Home    Recommended diet:  Cardiac Diet, Diabetic Diet, and Low fat, Low cholesterol    Recommended activity: Activity as tolerated    If you have questions regarding the hospital related prescriptions or hospital related issues please call at [phone]. If you experience any of the following symptoms then please call your primary care physician or return to the emergency room if you cannot get hold of your doctor:  Fever, chills, nausea, vomiting, diarrhea, change in mentation, falling, bleeding, shortness of breath,     Follow Up:  PCP  you are to call and set up an appointment to see them in 7-10 days. Information obtained by :  I understand that if any problems occur once I am at home I am to contact my physician. I understand and acknowledge receipt of the instructions indicated above.                                                                                                                                            Physician's or R.N.'s Signature                                                                  Date/Time Patient or Representative Signature                                                          Date/Time

## 2023-03-30 NOTE — PROGRESS NOTES
Cardiology Progress Note  3/30/2023     Admit Date: 3/29/2023  Admit Diagnosis: Chest pain [R07.9]  CC: none currently   Cardiac Assessment/Plan:    1) CAD: by cor Ca; No MI; atypical CP has resolved; chronic SPANGLER; unremarkable echo. *Rec: MPI tomorrow; OK for d/c after if normal.  2) HTN  3) Dyslipidemia, labs per PCP  4) RBBB; ASx  5) Palpitations: if neg tele, outpt holter via PCP as planned     6) DM  7) Anxiety  8) MS  9) Hypothryoidism. Current cardiac meds: asa; lisinopriol 40; crestor 20.      3/30: No CP/resting dyspnea  BPs 120-160; HR 50-70s; sinus; 97% RA  Nl TSH; Lipids pending. Current cardiac meds: asa; lisinopriol 40; crestor 20. Neg ETT this am: nuclear images pending. If unremarkable nuclear images, ok for d/c from cardiac standpoint. F/U with PCP for holter as planned.  _____________________________________  As noted in H&P: \"59 y.o.  female who presents with above complaints from home with  at bedside, initially presented to [facility] ER transfer to [facility] ER for cardiac evaluation. Patient presented with chief complaint of chest pain (described as epigastric /substernal , 8 out of 10 , worse with inspiration ) that started yesterday morning, that has since improved after nitro paste at [facility] ED, was being awaiting transfer to [facility] for cardiac work-up, patient was able to sleep, woke up with chest soreness and pain again-at that time patient was transferred to [facility] ED for more urgent cardiac work-up. Patient had unremarkable troponins x 4 in past 24 hours, on room of the EKG, with negative CTA chest for PE or dissection, but was positive for severe coronary calcification. Patient currently chest pain-free when seen by me in the ED at [facility]. \"     ______________________________________________________________________     The patient reports no exertional chest pain.  She has chronic SPANGLER. Intermittent palpitations (skipping or fast, q.day for few seconds; Holter w/ PCP planned). She has long h/o atypical chest pain; negative stress test 2017; felt better until the last few months which she has had intermittent chest pressure, 0-2 X/ week; usually just a few seconds. She had hours of chest discomfort yesterday which resolved spontaneously. No current complaints. Current echo was unremarkable. No PND, orthopnea, palpitations, pre-syncope, syncope, peripheral edema, or decrease in exercise tolerance. No current complaints. ECG: sinus; RBBB; no acute changes. Tele: sinus  CXR: \"The heart size is normal. There is no acute infiltrate. \"  Chest CTA: \"1. No evidence of pulmonary embolism or acute airspace disease. 2.  Severe coronary calcifications. \"     Notable labs: Nl trop x4. Nl K, Cr, Hg, Plts. Echo 3/29/23:    Left Ventricle: Normal left ventricular systolic function with a visually estimated EF of 65%. Left ventricle size is normal. Mildly increased wall thickness. Normal wall motion. Right Ventricle: Right ventricle size is normal. Normal systolic function. For other plans, see orders.   Hospital problem list     Active Hospital Problems    Diagnosis Date Noted    Chest pain 03/29/2023        Subjective: Frances Asher reports       Chest pain X none  consistent with:  Non-cardiac CP         Atypical CP     None now  On going  Anginal CP     Dyspnea X none  at rest  with exertion         improved  unchanged  worse              PND X none  overnight       Orthopnea X none  improved  unchanged  worse   Presyncope X none  improved  unchanged  worse     Ambulated in hallway without symptoms   Yes   Ambulated in room without symptoms  Yes   Objective:     Physical Exam:  Overall VSSAF;  Visit Vitals  BP (!) 151/80 (BP 1 Location: Right upper arm, BP Patient Position: At rest)   Pulse (!) 57   Temp 98.4 °F (36.9 °C)   Resp 17   Ht 5' 5\" (1.651 m) Wt 109.3 kg (241 lb)   SpO2 97%   BMI 40.10 kg/m²     Temp (24hrs), Av.2 °F (36.8 °C), Min:97.9 °F (36.6 °C), Max:98.4 °F (36.9 °C)    Patient Vitals for the past 8 hrs:   Pulse   23 0400 (!) 57     Patient Vitals for the past 8 hrs:   Resp   23 0400 17     Patient Vitals for the past 8 hrs:   BP   23 0400 (!) 151/80     No intake or output data in the 24 hours ending 23 1006  General Appearance: Well developed, well nourished, no acute distress. Ears/Nose/Mouth/Throat:   Normal MM; anicteric. JVP: WNL   Resp:   Lungs clear to auscultation bilaterally. Nl resp effort. Cardiovascular:  RRR, S1, S2 normal, no new murmur. No gallop or rub. Abdomen:   Soft, non-tender, bowel sounds are present. Extremities: No edema bilaterally. Skin:  Neuro: Warm and dry. A/O x3, grossly nonfocal       cath site intact w/o hematoma or new bruit; distal pulse unchanged  Yes   Data Review:     Telemetry independently reviewed x sinus  chronic afib  parox afib  NSVT     ECG independently reviewed  NSR  afib  no significant changes  NSST-Tw chgs     x no new ECG provided for review   Lab results reviewed as noted below. Current medications reviewed as noted below. No results for input(s): PH, PCO2, PO2 in the last 72 hours. No results for input(s): CPK, CKMB, CKNDX, TROIQ in the last 72 hours.   Recent Labs     23      K 3.9      CO2 26   BUN 23*   CREA 1.01   GLU 90   CA 9.1   ALB 4.3   WBC 8.4   HGB 12.3   HCT 36.3        No results found for: CHOL, CHOLX, CHLST, CHOLV, HDL, HDLP, LDL, LDLC, DLDLP, TGLX, TRIGL, TRIGP, CHHD, CHHDX  Recent Labs     23   AP 60   TP 7.3   ALB 4.3   GLOB 3.0     Recent Labs     03/28/23  2105   INR 0.9   PTP 9.4      No components found for: GLPOC    Current Facility-Administered Medications   Medication Dose Route Frequency    latanoprost (XALATAN) 0.005 % ophthalmic solution 1 Drop  1 Drop Both Eyes QPM levothyroxine (SYNTHROID) tablet 50 mcg  50 mcg Oral 6am    lisinopriL (PRINIVIL, ZESTRIL) tablet 40 mg  40 mg Oral DAILY    rosuvastatin (CRESTOR) tablet 20 mg  20 mg Oral QHS    tiZANidine (ZANAFLEX) tablet 4 mg  4 mg Oral Q6H PRN    sodium chloride (NS) flush 5-40 mL  5-40 mL IntraVENous Q8H    sodium chloride (NS) flush 5-40 mL  5-40 mL IntraVENous PRN    acetaminophen (TYLENOL) tablet 650 mg  650 mg Oral Q6H PRN    Or    acetaminophen (TYLENOL) suppository 650 mg  650 mg Rectal Q6H PRN    polyethylene glycol (MIRALAX) packet 17 g  17 g Oral DAILY PRN    ondansetron (ZOFRAN ODT) tablet 4 mg  4 mg Oral Q8H PRN    Or    ondansetron (ZOFRAN) injection 4 mg  4 mg IntraVENous Q6H PRN    enoxaparin (LOVENOX) injection 30 mg  30 mg SubCUTAneous Q12H    insulin lispro (HUMALOG) injection   SubCUTAneous AC&HS    glucose chewable tablet 16 g  4 Tablet Oral PRN    glucagon (GLUCAGEN) injection 1 mg  1 mg IntraMUSCular PRN    nitroglycerin (NITROSTAT) tablet 0.4 mg  0.4 mg SubLINGual PRN    pantoprazole (PROTONIX) tablet 40 mg  40 mg Oral ACB    gabapentin (NEURONTIN) capsule 100 mg  100 mg Oral TID    aspirin chewable tablet 81 mg  81 mg Oral DAILY    oxybutynin chloride XL (DITROPAN XL) tablet 10 mg  10 mg Oral DAILY        Gracia Galindo MD

## 2023-03-30 NOTE — PROGRESS NOTES
CM informed that pt will be ready to d/c once stress test is completed with no needs. Pt will transport home at the time of d/c.  Pt denies additional needs at this time.     CHERYL Whalen, [address] [facility]

## 2023-03-30 NOTE — DISCHARGE SUMMARY
Hospitalist Discharge Summary     Patient ID:  Frances Asher  [id number]  59 y.o.  1965  3/29/2023    PCP on record: Abiodun Burns NP    Admit date: 3/29/2023  Discharge date and time: 3/30/2023    DISCHARGE DIAGNOSIS:    Atypical Chest pain POA- cleared by cardiology after normal Echo and negative stress test, cont baby Aspirin for primary prevention  Hypertension  Diabetes  Hypothyroidism  History of MS  Neurogenic bladder  Glaucoma  Ex-smoker  Full code      CONSULTATIONS:  IP CONSULT TO CARDIOLOGY    Excerpted HPI from H&P of Rosibel Huerta MD:  [age] y.o.  female who presents with above complaints from home with  at bedside, initially presented to [facility] ER transfer to [facility] ER for cardiac evaluation. Patient presented with chief complaint of chest pain (described as epigastric /substernal , 8 out of 10 , worse with inspiration ) that started yesterday morning, that has since improved after nitro paste at [facility] ED, was being awaiting transfer to [facility] for cardiac work-up, patient was able to sleep, woke up with chest soreness and pain again-at that time patient was transferred to [facility] ED for more urgent cardiac work-up. Patient had unremarkable troponins x 4 in past 24 hours, on room of the EKG, with negative CTA chest for PE or dissection, but was positive for severe coronary calcification. Patient currently chest pain-free when seen by me in the ED at [facility]. We were asked to admit for work up and evaluation of the above problems\"    ______________________________________________________________________  DISCHARGE SUMMARY/HOSPITAL COURSE:  for full details see H&P, daily progress notes, labs, consult notes. Atypical Chest pain POA  Hypertension  Diabetes  Hypothyroidism  History of MS  Significant family history for CAD  EKG normal sinus rhythm 80 bpm, ?   Right bundle branch block  hsTroponin 6-6-6->5  Chest x-ray negative acute  CTA chest negative for PE, severe coronary calcification noted     Observation on remote telemetry bed  Sublingual nitroglycerin as needed chest pain  Status post aspirin x1 at [facility] ED, continue aspirin 81 mg daily  Continue statin on Crestor at home  Continue home lisinopril  Check 2D echo  Inpatient cardiology consulted by ED-n.p.o. after midnight for stress test in a.m. noted  Fingersticks before every meal and at bedtime, hold home metformin, sliding scale lispro here  Continue home Synthroid  Continue home Zanaflex as needed for muscle spasms  Continue home gabapentin  Trial of PPI     Glaucoma  Continue home eyedrops     Ex-smoker  Neurogenic bladder  Continue oxybutynin        _______________________________________________________________________  Patient seen and examined by me on discharge day. Pertinent Findings:  Gen:    Not in distress  Chest: Clear lungs  CVS:   Regular rhythm. No edema  Abd:  Soft, not distended, not tender  Neuro:  Alert,   _______________________________________________________________________  DISCHARGE MEDICATIONS:   Current Discharge Medication List        START taking these medications    Details   aspirin 81 mg chewable tablet Take 1 Tablet by mouth daily. Qty: 30 Tablet, Refills: 0  Start date: 3/31/2023      pantoprazole (PROTONIX) 40 mg tablet Take 1 Tablet by mouth Daily (before breakfast). Qty: 30 Tablet, Refills: 0  Start date: 3/31/2023           CONTINUE these medications which have NOT CHANGED    Details   cyclobenzaprine (FLEXERIL) 10 mg tablet TAKE 1 TABLET BY MOUTH THREE TIMES A DAY AS NEEDED      gabapentin (NEURONTIN) 100 mg capsule Take 1 Capsule by mouth three (3) times daily. Max Daily Amount: 300 mg.   Qty: 30 Capsule, Refills: 0    Associated Diagnoses: Anaphylaxis, initial encounter      ocrelizumab (Ocrevus) 30 mg/mL soln injection Ocrevus 30 mg/mL intravenous solution   Inject by intravenous route.      tiZANidine (ZANAFLEX) 4 mg tablet tizanidine 4 mg tablet   TAKE 1 TABLET BY MOUTH EVERY 8 HOURS AS NEEDED FOR MUSCLE SPASMS      trospium (SANCTURA XL) 60 mg capsule Take 1 Capsule by mouth Daily (before breakfast). Qty: 90 Capsule, Refills: 3      COVID-19 mRNA Vacc (MODERNA) 100 mcg/0.5 mL susp injection Moderna COVID-19 Vaccine (PF) 100 mcg/0.5 mL intramuscular susp. (EUA)   Inject by intramuscular route.      ergocalciferol (ERGOCALCIFEROL) 1,250 mcg (50,000 unit) capsule Vitamin D2 1,250 mcg (50,000 unit) capsule      latanoprost (XALATAN) 0.005 % ophthalmic solution latanoprost 0.005 % eye drops      levothyroxine (SYNTHROID) 50 mcg tablet levothyroxine 50 mcg tablet      lisinopriL (PRINIVIL, ZESTRIL) 40 mg tablet lisinopril 40 mg tablet   TAKE 1 TABLET BY MOUTH DAILY      metFORMIN (GLUCOPHAGE) 1,000 mg tablet 500 mg two (2) times daily (with meals). rosuvastatin (CRESTOR) 20 mg tablet rosuvastatin 20 mg tablet           STOP taking these medications       cetirizine (ZyrTEC) 10 mg tablet Comments:   Reason for Stopping:         amantadine HCL (SYMMETREL) 100 mg capsule Comments:   Reason for Stopping:         ascorbic acid, vitamin C, (VITAMIN C) 250 mg tablet Comments:   Reason for Stopping:         hydroCHLOROthiazide (HYDRODIURIL) 25 mg tablet Comments:   Reason for Stopping:                 Patient Follow Up Instructions: Activity: Activity as tolerated  Diet: Cardiac, Diabetic Diet and Low fat, Low cholesterol      Follow-up with PCP in 2 weeks.       Follow-up Information       Follow up With Specialties Details Why Contact Info    Louis Campos NP Nurse Practitioner   [address] [place] [address]  [phone]            ________________________________________________________________    Risk of deterioration: Low    Condition at Discharge:  Stable  __________________________________________________________________    Disposition  Home with family, no needs    ____________________________________________________________________    Code Status: Full Code  ___________________________________________________________________      Total time in minutes spent coordinating this discharge (includes going over instructions, follow-up, prescriptions, and preparing report for sign off to her PCP) :  35 minutes    Signed:  Patrica Quezada MD

## 2023-03-30 NOTE — PROGRESS NOTES
Problem: Falls - Risk of  Goal: *Absence of Falls  Description: Document Cleve Boss Fall Risk and appropriate interventions in the flowsheet.   Outcome: Progressing Towards Goal  Note: Fall Risk Interventions:  Bed rails X3  Bed alarm on  Patient to call before getting out of bed  Call light within reach  Non slip socks on  Clear clusters       Problem: Patient Education: Go to Patient Education Activity  Goal: Patient/Family Education  Outcome: Progressing Towards Goal    Keep NPO for stress test  Monitor tele   Monitor VS

## 2023-03-30 NOTE — PROGRESS NOTES
Bedside shift change report given to Marvin Lau RN (oncoming nurse) by Marlen Hearn RN (offgoing nurse). Report included the following information SBAR, Kardex, ED Summary, Procedure Summary, Intake/Output, MAR, and Recent Results.

## 2023-03-30 NOTE — PROGRESS NOTES
Discharge order noted, AVS reviewed with patient and spouse. JOSE removed. No concerns voiced. Patient breathing freely on room air in no obvious distress. Wheeled to transport area with  or home.       Christel LOZANO

## 2023-03-30 NOTE — PROGRESS NOTES
1221 - Delivered VOON to patient. See scanned documents. Trevor Collet, Care Management Assistant    Attempted to deliver and verbally explain the MOON with patient. Patient was DEANNA.  Trevor Collet, Care Management Assistant

## 2023-04-03 LAB
STRESS ANGINA INDEX: 0
STRESS BASELINE DIAS BP: 87 MMHG
STRESS BASELINE HR: 64 BPM
STRESS BASELINE ST DEPRESSION: 0 MM
STRESS BASELINE SYS BP: 179 MMHG
STRESS ESTIMATED WORKLOAD: 7 METS
STRESS EXERCISE DUR MIN: 5 MIN
STRESS EXERCISE DUR SEC: 0 SEC
STRESS O2 SAT PEAK: 100 %
STRESS O2 SAT REST: 98 %
STRESS PEAK DIAS BP: 90 MMHG
STRESS PEAK SYS BP: 192 MMHG
STRESS PERCENT HR ACHIEVED: 98 %
STRESS POST PEAK HR: 157 BPM
STRESS RATE PRESSURE PRODUCT: NORMAL BPM*MMHG
STRESS TARGET HR: 161 BPM

## 2023-05-04 ENCOUNTER — TRANSCRIBE ORDERS (OUTPATIENT)
Facility: HOSPITAL | Age: 58
End: 2023-05-04

## 2023-05-04 DIAGNOSIS — Z12.31 VISIT FOR SCREENING MAMMOGRAM: Primary | ICD-10-CM

## 2023-05-12 ENCOUNTER — HOSPITAL ENCOUNTER (OUTPATIENT)
Facility: HOSPITAL | Age: 58
Discharge: HOME | End: 2023-05-12
Payer: COMMERCIAL

## 2023-05-12 DIAGNOSIS — Z12.31 VISIT FOR SCREENING MAMMOGRAM: ICD-10-CM

## 2023-05-12 PROCEDURE — 77067 SCR MAMMO BI INCL CAD: CPT

## 2023-07-08 PROBLEM — I25.10 CORONARY ATHEROSCLEROSIS: Status: ACTIVE | Noted: 2023-07-08

## 2023-07-08 PROBLEM — I25.10 CORONARY ATHEROSCLEROSIS: Status: RESOLVED | Noted: 2023-07-08 | Resolved: 2023-07-08

## 2023-07-08 PROBLEM — I10 PRIMARY HYPERTENSION: Status: ACTIVE | Noted: 2017-02-23

## 2023-07-08 PROBLEM — I25.10 CAD (CORONARY ATHEROSCLEROTIC DISEASE): Status: ACTIVE | Noted: 2023-07-08

## 2023-07-08 PROBLEM — I45.10 RBBB (RIGHT BUNDLE BRANCH BLOCK): Status: ACTIVE | Noted: 2023-07-08

## 2023-07-12 ENCOUNTER — OFFICE VISIT (OUTPATIENT)
Age: 58
End: 2023-07-12
Payer: COMMERCIAL

## 2023-07-12 VITALS
HEART RATE: 79 BPM | DIASTOLIC BLOOD PRESSURE: 82 MMHG | WEIGHT: 231 LBS | TEMPERATURE: 98 F | SYSTOLIC BLOOD PRESSURE: 122 MMHG | RESPIRATION RATE: 24 BRPM | BODY MASS INDEX: 38.49 KG/M2 | OXYGEN SATURATION: 96 % | HEIGHT: 65 IN

## 2023-07-12 DIAGNOSIS — E78.00 HYPERCHOLESTEROLEMIA: ICD-10-CM

## 2023-07-12 DIAGNOSIS — I45.10 RBBB (RIGHT BUNDLE BRANCH BLOCK): ICD-10-CM

## 2023-07-12 DIAGNOSIS — R07.9 CHEST PAIN, UNSPECIFIED TYPE: ICD-10-CM

## 2023-07-12 DIAGNOSIS — I25.10 ATHEROSCLEROSIS OF NATIVE CORONARY ARTERY OF NATIVE HEART WITHOUT ANGINA PECTORIS: Primary | ICD-10-CM

## 2023-07-12 DIAGNOSIS — I10 PRIMARY HYPERTENSION: ICD-10-CM

## 2023-07-12 DIAGNOSIS — R00.2 PALPITATIONS: ICD-10-CM

## 2023-07-12 PROCEDURE — 93000 ELECTROCARDIOGRAM COMPLETE: CPT | Performed by: INTERNAL MEDICINE

## 2023-07-12 PROCEDURE — 3074F SYST BP LT 130 MM HG: CPT | Performed by: INTERNAL MEDICINE

## 2023-07-12 PROCEDURE — 3079F DIAST BP 80-89 MM HG: CPT | Performed by: INTERNAL MEDICINE

## 2023-07-12 PROCEDURE — 99204 OFFICE O/P NEW MOD 45 MIN: CPT | Performed by: INTERNAL MEDICINE

## 2023-07-12 RX ORDER — TIZANIDINE 4 MG/1
4 TABLET ORAL EVERY 8 HOURS PRN
COMMUNITY
Start: 2022-08-17

## 2023-07-12 RX ORDER — ASPIRIN 81 MG/1
81 TABLET, CHEWABLE ORAL DAILY
COMMUNITY

## 2023-07-12 RX ORDER — GABAPENTIN 300 MG/1
300 CAPSULE ORAL 3 TIMES DAILY
COMMUNITY
Start: 2023-06-26

## 2023-07-12 ASSESSMENT — PATIENT HEALTH QUESTIONNAIRE - PHQ9
SUM OF ALL RESPONSES TO PHQ QUESTIONS 1-9: 0
2. FEELING DOWN, DEPRESSED OR HOPELESS: 0
SUM OF ALL RESPONSES TO PHQ9 QUESTIONS 1 & 2: 0
SUM OF ALL RESPONSES TO PHQ QUESTIONS 1-9: 0
1. LITTLE INTEREST OR PLEASURE IN DOING THINGS: 0

## 2023-07-26 ENCOUNTER — APPOINTMENT (OUTPATIENT)
Facility: HOSPITAL | Age: 58
End: 2023-07-26
Payer: COMMERCIAL

## 2023-07-26 ENCOUNTER — HOSPITAL ENCOUNTER (EMERGENCY)
Facility: HOSPITAL | Age: 58
Discharge: HOME | End: 2023-07-26
Attending: EMERGENCY MEDICINE
Payer: COMMERCIAL

## 2023-07-26 VITALS
HEART RATE: 56 BPM | RESPIRATION RATE: 18 BRPM | HEIGHT: 65 IN | DIASTOLIC BLOOD PRESSURE: 81 MMHG | WEIGHT: 225 LBS | OXYGEN SATURATION: 96 % | TEMPERATURE: 98 F | SYSTOLIC BLOOD PRESSURE: 160 MMHG | BODY MASS INDEX: 37.49 KG/M2

## 2023-07-26 DIAGNOSIS — G35 MULTIPLE SCLEROSIS EXACERBATION (HCC): Primary | ICD-10-CM

## 2023-07-26 LAB
ANION GAP SERPL CALC-SCNC: 8 MMOL/L (ref 5–15)
APPEARANCE UR: CLEAR
BACTERIA URNS QL MICRO: NEGATIVE /HPF
BASOPHILS # BLD: 0 K/UL (ref 0–0.1)
BASOPHILS NFR BLD: 0 % (ref 0–1)
BILIRUB UR QL: NEGATIVE
BUN SERPL-MCNC: 15 MG/DL (ref 6–20)
BUN/CREAT SERPL: 16 (ref 12–20)
CALCIUM SERPL-MCNC: 9.1 MG/DL (ref 8.5–10.1)
CHLORIDE SERPL-SCNC: 104 MMOL/L (ref 97–108)
CO2 SERPL-SCNC: 30 MMOL/L (ref 21–32)
COLOR UR: NORMAL
CREAT SERPL-MCNC: 0.92 MG/DL (ref 0.55–1.02)
DIFFERENTIAL METHOD BLD: ABNORMAL
EOSINOPHIL # BLD: 0.1 K/UL (ref 0–0.4)
EOSINOPHIL NFR BLD: 2 % (ref 0–7)
EPITH CASTS URNS QL MICRO: NORMAL /LPF
ERYTHROCYTE [DISTWIDTH] IN BLOOD BY AUTOMATED COUNT: 12.8 % (ref 11.5–14.5)
GLUCOSE SERPL-MCNC: 165 MG/DL (ref 65–100)
GLUCOSE UR STRIP.AUTO-MCNC: NEGATIVE MG/DL
HCT VFR BLD AUTO: 35.8 % (ref 35–47)
HGB BLD-MCNC: 11.7 G/DL (ref 11.5–16)
HGB UR QL STRIP: NEGATIVE
IMM GRANULOCYTES # BLD AUTO: 0 K/UL (ref 0–0.04)
IMM GRANULOCYTES NFR BLD AUTO: 0 % (ref 0–0.5)
KETONES UR QL STRIP.AUTO: NEGATIVE MG/DL
LEUKOCYTE ESTERASE UR QL STRIP.AUTO: NEGATIVE
LYMPHOCYTES # BLD: 0.6 K/UL (ref 0.8–3.5)
LYMPHOCYTES NFR BLD: 9 % (ref 12–49)
MCH RBC QN AUTO: 29.7 PG (ref 26–34)
MCHC RBC AUTO-ENTMCNC: 32.7 G/DL (ref 30–36.5)
MCV RBC AUTO: 90.9 FL (ref 80–99)
MONOCYTES # BLD: 0.5 K/UL (ref 0–1)
MONOCYTES NFR BLD: 7 % (ref 5–13)
NEUTS SEG # BLD: 5.5 K/UL (ref 1.8–8)
NEUTS SEG NFR BLD: 82 % (ref 32–75)
NITRITE UR QL STRIP.AUTO: NEGATIVE
NRBC # BLD: 0 K/UL (ref 0–0.01)
NRBC BLD-RTO: 0 PER 100 WBC
PH UR STRIP: 6 (ref 5–8)
PLATELET # BLD AUTO: 195 K/UL (ref 150–400)
PLATELET COMMENT: ABNORMAL
PMV BLD AUTO: 10 FL (ref 8.9–12.9)
POTASSIUM SERPL-SCNC: 3.9 MMOL/L (ref 3.5–5.1)
PROT UR STRIP-MCNC: NEGATIVE MG/DL
RBC # BLD AUTO: 3.94 M/UL (ref 3.8–5.2)
RBC #/AREA URNS HPF: NORMAL /HPF (ref 0–5)
RBC MORPH BLD: ABNORMAL
SODIUM SERPL-SCNC: 142 MMOL/L (ref 136–145)
SP GR UR REFRACTOMETRY: 1.01 (ref 1–1.03)
TSH SERPL DL<=0.05 MIU/L-ACNC: 1.55 UIU/ML (ref 0.36–3.74)
URINE CULTURE IF INDICATED: NORMAL
UROBILINOGEN UR QL STRIP.AUTO: 0.2 EU/DL (ref 0.2–1)
WBC # BLD AUTO: 6.7 K/UL (ref 3.6–11)
WBC URNS QL MICRO: NORMAL /HPF (ref 0–4)

## 2023-07-26 PROCEDURE — 6360000002 HC RX W HCPCS: Performed by: EMERGENCY MEDICINE

## 2023-07-26 PROCEDURE — 72156 MRI NECK SPINE W/O & W/DYE: CPT

## 2023-07-26 PROCEDURE — 96365 THER/PROPH/DIAG IV INF INIT: CPT

## 2023-07-26 PROCEDURE — A9579 GAD-BASE MR CONTRAST NOS,1ML: HCPCS | Performed by: EMERGENCY MEDICINE

## 2023-07-26 PROCEDURE — 6360000004 HC RX CONTRAST MEDICATION: Performed by: EMERGENCY MEDICINE

## 2023-07-26 PROCEDURE — 70553 MRI BRAIN STEM W/O & W/DYE: CPT

## 2023-07-26 PROCEDURE — 80048 BASIC METABOLIC PNL TOTAL CA: CPT

## 2023-07-26 PROCEDURE — 81001 URINALYSIS AUTO W/SCOPE: CPT

## 2023-07-26 PROCEDURE — 96366 THER/PROPH/DIAG IV INF ADDON: CPT

## 2023-07-26 PROCEDURE — 85025 COMPLETE CBC W/AUTO DIFF WBC: CPT

## 2023-07-26 PROCEDURE — 99285 EMERGENCY DEPT VISIT HI MDM: CPT

## 2023-07-26 PROCEDURE — 36415 COLL VENOUS BLD VENIPUNCTURE: CPT

## 2023-07-26 PROCEDURE — 84443 ASSAY THYROID STIM HORMONE: CPT

## 2023-07-26 PROCEDURE — 2580000003 HC RX 258: Performed by: EMERGENCY MEDICINE

## 2023-07-26 RX ORDER — METHYLPREDNISOLONE 4 MG/1
TABLET ORAL
Qty: 1 KIT | Refills: 0 | Status: SHIPPED | OUTPATIENT
Start: 2023-07-26 | End: 2023-08-01

## 2023-07-26 RX ADMIN — SODIUM CHLORIDE 1000 MG: 900 INJECTION INTRAVENOUS at 10:22

## 2023-07-26 RX ADMIN — GADOTERIDOL 15 ML: 279.3 INJECTION, SOLUTION INTRAVENOUS at 13:05

## 2023-07-26 ASSESSMENT — PAIN - FUNCTIONAL ASSESSMENT: PAIN_FUNCTIONAL_ASSESSMENT: 0-10

## 2023-07-26 ASSESSMENT — LIFESTYLE VARIABLES
HOW OFTEN DO YOU HAVE A DRINK CONTAINING ALCOHOL: NEVER
HOW MANY STANDARD DRINKS CONTAINING ALCOHOL DO YOU HAVE ON A TYPICAL DAY: PATIENT DOES NOT DRINK

## 2023-07-26 ASSESSMENT — ENCOUNTER SYMPTOMS
COUGH: 0
EYE REDNESS: 0
ABDOMINAL PAIN: 0
DIARRHEA: 0
SHORTNESS OF BREATH: 0
NAUSEA: 0
SORE THROAT: 0
VOMITING: 0

## 2023-07-26 ASSESSMENT — PAIN DESCRIPTION - DESCRIPTORS: DESCRIPTORS: ACHING

## 2023-07-26 ASSESSMENT — PAIN DESCRIPTION - LOCATION: LOCATION: GENERALIZED

## 2023-07-26 ASSESSMENT — PAIN SCALES - GENERAL: PAINLEVEL_OUTOF10: 6

## 2023-07-26 NOTE — ED TRIAGE NOTES
Pt arrived by POV for muscle weakness. Pt reports she has MS and is experiencing increases muscle weakness on her right side. Pt reports she called her neurologist in who advised pt to have outpatient labs but then her neurologist called back this AM and advised pt to go to the emergency room for labs, MRI and possible steroid infusion. Pt is awake alert and oriented X 4, pt educated on ER flow.    Pt ambulated with the use of her cane and this writer (pt declined W/C)

## 2023-07-26 NOTE — ED PROVIDER NOTES
sounds. Abdominal:      Palpations: Abdomen is soft. Tenderness: There is no abdominal tenderness. Skin:     General: Skin is warm and dry. Findings: No rash. Neurological:      General: No focal deficit present. Mental Status: She is alert and oriented to person, place, and time. Comments: Patient with mild weakness in right upper and lower extremity compared to the left although still 5 out of 5 strength against resistance. Clear speech. EOMI.  5 out of 5 strength in left upper and lower extremities. No facial droop or asymmetry. Patient alert and oriented x4. Follows commands without difficulty.    Psychiatric:         Behavior: Behavior normal.             Diagnostic Study Results     Labs -     Recent Results (from the past 12 hour(s))   CBC with Auto Differential    Collection Time: 07/26/23  9:48 AM   Result Value Ref Range    WBC 6.7 3.6 - 11.0 K/uL    RBC 3.94 3.80 - 5.20 M/uL    Hemoglobin 11.7 11.5 - 16.0 g/dL    Hematocrit 35.8 35.0 - 47.0 %    MCV 90.9 80.0 - 99.0 FL    MCH 29.7 26.0 - 34.0 PG    MCHC 32.7 30.0 - 36.5 g/dL    RDW 12.8 11.5 - 14.5 %    Platelets 486 473 - 273 K/uL    MPV 10.0 8.9 - 12.9 FL    Nucleated RBCs 0.0 0  WBC    nRBC 0.00 0.00 - 0.01 K/uL    Neutrophils % 82 (H) 32 - 75 %    Lymphocytes % 9 (L) 12 - 49 %    Monocytes % 7 5 - 13 %    Eosinophils % 2 0 - 7 %    Basophils % 0 0 - 1 %    Immature Granulocytes 0 0.0 - 0.5 %    Neutrophils Absolute 5.5 1.8 - 8.0 K/UL    Lymphocytes Absolute 0.6 (L) 0.8 - 3.5 K/UL    Monocytes Absolute 0.5 0.0 - 1.0 K/UL    Eosinophils Absolute 0.1 0.0 - 0.4 K/UL    Basophils Absolute 0.0 0.0 - 0.1 K/UL    Absolute Immature Granulocyte 0.0 0.00 - 0.04 K/UL    Differential Type SMEAR SCANNED      Platelet Comment ADEQUATE PLATELETS      RBC Comment NORMOCYTIC, NORMOCHROMIC     Basic Metabolic Panel    Collection Time: 07/26/23  9:48 AM   Result Value Ref Range    Sodium 142 136 - 145 mmol/L    Potassium 3.9 3.5 - 5.1

## 2023-09-01 ENCOUNTER — HOSPITAL ENCOUNTER (EMERGENCY)
Facility: HOSPITAL | Age: 58
Discharge: HOME | End: 2023-09-01
Attending: EMERGENCY MEDICINE
Payer: COMMERCIAL

## 2023-09-01 VITALS
SYSTOLIC BLOOD PRESSURE: 172 MMHG | WEIGHT: 225 LBS | TEMPERATURE: 98 F | BODY MASS INDEX: 37.49 KG/M2 | HEIGHT: 65 IN | HEART RATE: 78 BPM | RESPIRATION RATE: 14 BRPM | DIASTOLIC BLOOD PRESSURE: 89 MMHG | OXYGEN SATURATION: 97 %

## 2023-09-01 DIAGNOSIS — R21 RASH AND OTHER NONSPECIFIC SKIN ERUPTION: Primary | ICD-10-CM

## 2023-09-01 PROCEDURE — 99283 EMERGENCY DEPT VISIT LOW MDM: CPT

## 2023-09-01 RX ORDER — PREDNISONE 5 MG/1
TABLET ORAL
Qty: 1 EACH | Refills: 0 | Status: SHIPPED | OUTPATIENT
Start: 2023-09-01

## 2023-09-01 RX ORDER — HYDROXYZINE HYDROCHLORIDE 25 MG/1
25 TABLET, FILM COATED ORAL EVERY 8 HOURS PRN
Qty: 20 TABLET | Refills: 0 | Status: SHIPPED | OUTPATIENT
Start: 2023-09-01 | End: 2023-09-11

## 2023-09-01 ASSESSMENT — LIFESTYLE VARIABLES
HOW MANY STANDARD DRINKS CONTAINING ALCOHOL DO YOU HAVE ON A TYPICAL DAY: PATIENT DECLINED
HOW OFTEN DO YOU HAVE A DRINK CONTAINING ALCOHOL: NEVER

## 2023-09-01 ASSESSMENT — PAIN - FUNCTIONAL ASSESSMENT: PAIN_FUNCTIONAL_ASSESSMENT: NONE - DENIES PAIN

## 2023-09-01 NOTE — ED NOTES
Immunocompromised patient, last treated one week ago c/o rash on neck , face and left AC. That is not controlled with benadryl at home.   afebrile     Eleno Contreras RN  09/01/23 [id number]

## 2023-09-01 NOTE — ED NOTES
Written and verbal discharge instructions reviewed with patient. All discharge medications reviewed and explained. Understanding verbalized, all questions answered. Ambulated out, gait steady.        Yany Balderrama RN  09/01/23 1170

## 2023-09-03 NOTE — ED PROVIDER NOTES
[address] [facility] [place]       Pt Name: Tigre Lezama  MRN: [id number]  [address] [facility] 1965  Date of evaluation: 2023  Provider: Domonique Doss MD   PCP: ADAL Duncan NP  Note Started: 2:28 PM EDT 9/3/23     CHIEF COMPLAINT       Chief Complaint   Patient presents with    Rash        HISTORY OF PRESENT ILLNESS: 1 or more elements      History From: patient, History limited by: none     Tigre Lezama is a 59 y.o. female who presents with a cc of rash x 5 days       Please See MDM for Additional Details of the HPI/PMH  Nursing Notes were all reviewed and agreed with or any disagreements were addressed in the HPI. REVIEW OF SYSTEMS        Positives and Pertinent negatives as per HPI. PAST HISTORY     Past Medical History:  Past Medical History:   Diagnosis Date    Family history of benign essential tremor     History of recurrent UTIs     Hyperlipidemia     Hypertension     Hypertension     Hypothyroidism     MS (multiple sclerosis) (HCC)     Multiple sclerosis (HCC)     Neurogenic bladder     Neurogenic bladder     Obesity     Obesity     Vitamin D deficiency     Vitamin D deficiency        Past Surgical History:  Past Surgical History:   Procedure Laterality Date     SECTION      HYSTERECTOMY (CERVIX STATUS UNKNOWN)      TOTAL HIP ARTHROPLASTY      left       Family History:  History reviewed. No pertinent family history. Social History:  Social History     Tobacco Use    Smoking status: Former    Smokeless tobacco: Never   Substance Use Topics    Alcohol use: Not Currently    Drug use: Yes     Types: Marijuana Jacquenette Pun)       Allergies:   Allergies   Allergen Reactions    Pregabalin Anaphylaxis, Swelling and Rash     Pt does not have reaction to gabapentin  Pt does not have reaction to gabapentin  Pt does not have reaction to gabapentin  Pt does not have reaction to gabapentin      Ciprofloxacin Other (See Comments)     Dizziness and shallow breath

## 2024-07-10 PROBLEM — E78.00 HYPERCHOLESTEROLEMIA: Status: ACTIVE | Noted: 2017-01-26

## 2024-07-17 ENCOUNTER — OFFICE VISIT (OUTPATIENT)
Age: 59
End: 2024-07-17
Payer: MEDICARE

## 2024-07-17 VITALS
OXYGEN SATURATION: 97 % | WEIGHT: 250 LBS | HEIGHT: 65 IN | TEMPERATURE: 98 F | DIASTOLIC BLOOD PRESSURE: 82 MMHG | RESPIRATION RATE: 20 BRPM | SYSTOLIC BLOOD PRESSURE: 143 MMHG | HEART RATE: 64 BPM | BODY MASS INDEX: 41.65 KG/M2

## 2024-07-17 DIAGNOSIS — I25.10 ATHEROSCLEROSIS OF NATIVE CORONARY ARTERY OF NATIVE HEART WITHOUT ANGINA PECTORIS: Primary | ICD-10-CM

## 2024-07-17 DIAGNOSIS — I10 ESSENTIAL HYPERTENSION: ICD-10-CM

## 2024-07-17 DIAGNOSIS — E78.00 HYPERCHOLESTEROLEMIA: ICD-10-CM

## 2024-07-17 DIAGNOSIS — R07.9 CHEST PAIN, UNSPECIFIED TYPE: ICD-10-CM

## 2024-07-17 DIAGNOSIS — I45.10 RBBB (RIGHT BUNDLE BRANCH BLOCK): ICD-10-CM

## 2024-07-17 PROCEDURE — 99214 OFFICE O/P EST MOD 30 MIN: CPT | Performed by: INTERNAL MEDICINE

## 2024-07-17 PROCEDURE — G8428 CUR MEDS NOT DOCUMENT: HCPCS | Performed by: INTERNAL MEDICINE

## 2024-07-17 PROCEDURE — 3077F SYST BP >= 140 MM HG: CPT | Performed by: INTERNAL MEDICINE

## 2024-07-17 PROCEDURE — G8417 CALC BMI ABV UP PARAM F/U: HCPCS | Performed by: INTERNAL MEDICINE

## 2024-07-17 PROCEDURE — 3017F COLORECTAL CA SCREEN DOC REV: CPT | Performed by: INTERNAL MEDICINE

## 2024-07-17 PROCEDURE — 1036F TOBACCO NON-USER: CPT | Performed by: INTERNAL MEDICINE

## 2024-07-17 PROCEDURE — 3079F DIAST BP 80-89 MM HG: CPT | Performed by: INTERNAL MEDICINE

## 2024-07-17 RX ORDER — HYDROCHLOROTHIAZIDE 12.5 MG/1
12.5 TABLET ORAL DAILY
COMMUNITY

## 2024-07-17 ASSESSMENT — PATIENT HEALTH QUESTIONNAIRE - PHQ9
1. LITTLE INTEREST OR PLEASURE IN DOING THINGS: NOT AT ALL
2. FEELING DOWN, DEPRESSED OR HOPELESS: NOT AT ALL
SUM OF ALL RESPONSES TO PHQ QUESTIONS 1-9: 0
SUM OF ALL RESPONSES TO PHQ9 QUESTIONS 1 & 2: 0

## 2024-07-24 DIAGNOSIS — Z12.31 SCREENING MAMMOGRAM FOR BREAST CANCER: Primary | ICD-10-CM

## 2024-07-30 ENCOUNTER — HOSPITAL ENCOUNTER (OUTPATIENT)
Facility: HOSPITAL | Age: 59
Discharge: HOME | End: 2024-08-02
Payer: MEDICARE

## 2024-07-30 DIAGNOSIS — Z12.31 SCREENING MAMMOGRAM FOR BREAST CANCER: ICD-10-CM

## 2024-07-30 PROCEDURE — 77063 BREAST TOMOSYNTHESIS BI: CPT

## 2025-01-05 NOTE — PROGRESS NOTES
Identified pt with two pt identifiers(name and ). Reviewed record in preparation for visit and have obtained necessary documentation.  Chief Complaint   Patient presents with    Coronary Artery Disease    Chest Pain    Hypertension    Cholesterol Problem    Other     RBBB      BP (!) 143/82 (Site: Left Upper Arm, Position: Sitting, Cuff Size: Large Adult)   Pulse 64   Temp 98 °F (36.7 °C) (Temporal)   Resp 20   Ht 1.651 m (5' 5\")   Wt 113.4 kg (250 lb)   SpO2 97%   BMI 41.60 kg/m²       Medications reviewed/approved by provider.      Health Maintenance Review: Patient reminded of \"due or due soon\" health maintenance. I have asked the patient to contact his/her primary care provider (PCP) for follow-up on his/her health maintenance.    Coordination of Care Questionnaire:  :   1) Have you been to an emergency room, urgent care, or hospitalized since your last visit?  If yes, where when, and reason for visit? no       2. Have seen or consulted any other health care provider since your last visit?   If yes, where when, and reason for visit?  no      Patient is accompanied by spouse I have received verbal consent from Alba Toribio to discuss any/all medical information while they are present in the room.    
39.2
estimated EF of 65%. Left ventricle size is normal. Mildly increased wall thickness. Normal wall motion.    Right Ventricle: Right ventricle size is normal. Normal systolic function.    Signed by: Jovany Blair MD on 3/29/2023  3:42 PM      STRESS:  03/30/23    NM STRESS TEST WITH MYOCARDIAL PERFUSION 04/03/2023  6:26 PM (Final)    Narrative  This is a summary report. The complete report is available in the patient's medical record. If you cannot access the medical record, please contact the sending organization for a detailed fax or copy.      ECG: Resting ECG demonstrates normal sinus rhythm and right bundle branch block.    Stress Test: A Guerrero protocol stress test was performed. Overall, the patient's exercise capacity was below average for their age. The patient was stressed for 5 min and 0 sec. Blood pressure demonstrated a hypertensive response and heart rate demonstrated a normal response to stress. The patient's heart rate recovery was normal. The patient reported no chest pain during the stress test.    INDICATION: Chest pain.  Hypertension    COMPARISON:  None.    CORRELATIVE IMAGING STUDIES:  None    TRACER: Tc 99m Sestamibi    TECHNIQUE:  Resting SPECT images of the heart were obtained following the uneventful intravenous administration of 10.6 mCi of Tc 99m Sestamibi.    Gated stress SPECT images of the heart were obtained following Guerrero exercise protocol and the uneventful intravenous administration of 30.7 mCi of Tc 99m Sestamibi.    FINDINGS:  The rest and stress perfusion images demonstrate no significant perfusion defect or evidence of myocardial reversibility.    The gated images demonstrate normal global and regional wall motion and thickening. Left ventricular ejection fraction is 54 %.    Impression:  Normal gated rest/stress myocardial perfusion imaging study. No evidence of myocardial ischemia or infarction. Left ventricular ejection fraction is 54 %.    Signed by: Deb Marti MD on